# Patient Record
Sex: FEMALE | Race: OTHER | HISPANIC OR LATINO | Employment: OTHER | URBAN - METROPOLITAN AREA
[De-identification: names, ages, dates, MRNs, and addresses within clinical notes are randomized per-mention and may not be internally consistent; named-entity substitution may affect disease eponyms.]

---

## 2021-01-15 DIAGNOSIS — Z23 NEED FOR VACCINATION: ICD-10-CM

## 2022-06-21 ENCOUNTER — HOSPITAL ENCOUNTER (OUTPATIENT)
Dept: RADIOLOGY | Facility: MEDICAL CENTER | Age: 87
End: 2022-06-21
Attending: FAMILY MEDICINE

## 2022-06-21 ENCOUNTER — OFFICE VISIT (OUTPATIENT)
Dept: URGENT CARE | Facility: PHYSICIAN GROUP | Age: 87
End: 2022-06-21
Payer: MEDICARE

## 2022-06-21 ENCOUNTER — APPOINTMENT (OUTPATIENT)
Dept: RADIOLOGY | Facility: MEDICAL CENTER | Age: 87
DRG: 481 | End: 2022-06-21
Attending: EMERGENCY MEDICINE
Payer: MEDICARE

## 2022-06-21 ENCOUNTER — HOSPITAL ENCOUNTER (INPATIENT)
Facility: MEDICAL CENTER | Age: 87
LOS: 4 days | DRG: 481 | End: 2022-06-25
Attending: EMERGENCY MEDICINE | Admitting: HOSPITALIST
Payer: MEDICARE

## 2022-06-21 VITALS
SYSTOLIC BLOOD PRESSURE: 122 MMHG | BODY MASS INDEX: 18.88 KG/M2 | OXYGEN SATURATION: 95 % | RESPIRATION RATE: 20 BRPM | WEIGHT: 100 LBS | DIASTOLIC BLOOD PRESSURE: 70 MMHG | HEIGHT: 61 IN | HEART RATE: 88 BPM | TEMPERATURE: 98.6 F

## 2022-06-21 DIAGNOSIS — S72.142A CLOSED DISPLACED INTERTROCHANTERIC FRACTURE OF LEFT FEMUR, INITIAL ENCOUNTER (HCC): ICD-10-CM

## 2022-06-21 DIAGNOSIS — M25.552 LEFT HIP PAIN: ICD-10-CM
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
## 2022-06-21 DIAGNOSIS — S72.142A CLOSED COMMINUTED INTERTROCHANTERIC FRACTURE OF LEFT FEMUR, INITIAL ENCOUNTER (HCC): ICD-10-CM

## 2022-06-21 DIAGNOSIS — I35.0 SEVERE AORTIC STENOSIS: ICD-10-CM

## 2022-06-21 PROBLEM — S72.143A INTERTROCHANTERIC FRACTURE OF FEMUR (HCC): Status: ACTIVE | Noted: 2022-06-21

## 2022-06-21 PROBLEM — M84.750A: Status: ACTIVE | Noted: 2022-06-21

## 2022-06-21 PROBLEM — I10 HYPERTENSION: Status: ACTIVE | Noted: 2022-06-21

## 2022-06-21 PROBLEM — M81.0 OSTEOPOROSIS: Status: ACTIVE | Noted: 2022-06-21

## 2022-06-21 PROBLEM — R42 LIGHTHEADEDNESS: Status: ACTIVE | Noted: 2022-06-21

## 2022-06-21 PROBLEM — D69.6 THROMBOCYTOPENIA (HCC): Status: ACTIVE | Noted: 2022-06-21

## 2022-06-21 PROBLEM — D72.829 LEUKOCYTOSIS: Status: ACTIVE | Noted: 2022-06-21

## 2022-06-21 PROBLEM — E46 MALNUTRITION (HCC): Status: ACTIVE | Noted: 2022-06-21

## 2022-06-21 LAB
25(OH)D3 SERPL-MCNC: 23 NG/ML (ref 30–100)
ALBUMIN SERPL BCP-MCNC: 4.2 G/DL (ref 3.2–4.9)
ALBUMIN/GLOB SERPL: 1.2 G/DL
ALP SERPL-CCNC: 84 U/L (ref 30–99)
ALT SERPL-CCNC: 16 U/L (ref 2–50)
ANION GAP SERPL CALC-SCNC: 11 MMOL/L (ref 7–16)
APTT PPP: 29.8 SEC (ref 24.7–36)
AST SERPL-CCNC: 19 U/L (ref 12–45)
BASOPHILS # BLD AUTO: 0.4 % (ref 0–1.8)
BASOPHILS # BLD: 0.04 K/UL (ref 0–0.12)
BILIRUB SERPL-MCNC: 0.7 MG/DL (ref 0.1–1.5)
BUN SERPL-MCNC: 28 MG/DL (ref 8–22)
CALCIUM SERPL-MCNC: 9.5 MG/DL (ref 8.5–10.5)
CHLORIDE SERPL-SCNC: 108 MMOL/L (ref 96–112)
CO2 SERPL-SCNC: 23 MMOL/L (ref 20–33)
CREAT SERPL-MCNC: 0.6 MG/DL (ref 0.5–1.4)
EKG IMPRESSION: NORMAL
EOSINOPHIL # BLD AUTO: 0.05 K/UL (ref 0–0.51)
EOSINOPHIL NFR BLD: 0.5 % (ref 0–6.9)
ERYTHROCYTE [DISTWIDTH] IN BLOOD BY AUTOMATED COUNT: 53.3 FL (ref 35.9–50)
GFR SERPLBLD CREATININE-BSD FMLA CKD-EPI: 87 ML/MIN/1.73 M 2
GLOBULIN SER CALC-MCNC: 3.5 G/DL (ref 1.9–3.5)
GLUCOSE SERPL-MCNC: 121 MG/DL (ref 65–99)
HCT VFR BLD AUTO: 35.9 % (ref 37–47)
HGB BLD-MCNC: 12 G/DL (ref 12–16)
IMM GRANULOCYTES # BLD AUTO: 0.04 K/UL (ref 0–0.11)
IMM GRANULOCYTES NFR BLD AUTO: 0.4 % (ref 0–0.9)
INR PPP: 1.17 (ref 0.87–1.13)
LYMPHOCYTES # BLD AUTO: 0.82 K/UL (ref 1–4.8)
LYMPHOCYTES NFR BLD: 7.4 % (ref 22–41)
MAGNESIUM SERPL-MCNC: 2.1 MG/DL (ref 1.5–2.5)
MCH RBC QN AUTO: 31.4 PG (ref 27–33)
MCHC RBC AUTO-ENTMCNC: 33.4 G/DL (ref 33.6–35)
MCV RBC AUTO: 94 FL (ref 81.4–97.8)
MONOCYTES # BLD AUTO: 0.94 K/UL (ref 0–0.85)
MONOCYTES NFR BLD AUTO: 8.5 % (ref 0–13.4)
NEUTROPHILS # BLD AUTO: 9.22 K/UL (ref 2–7.15)
NEUTROPHILS NFR BLD: 82.8 % (ref 44–72)
NRBC # BLD AUTO: 0 K/UL
NRBC BLD-RTO: 0 /100 WBC
PLATELET # BLD AUTO: 147 K/UL (ref 164–446)
PMV BLD AUTO: 11.5 FL (ref 9–12.9)
POTASSIUM SERPL-SCNC: 3.4 MMOL/L (ref 3.6–5.5)
PROT SERPL-MCNC: 7.7 G/DL (ref 6–8.2)
PROTHROMBIN TIME: 14.5 SEC (ref 12–14.6)
RBC # BLD AUTO: 3.82 M/UL (ref 4.2–5.4)
SODIUM SERPL-SCNC: 142 MMOL/L (ref 135–145)
VIT B12 SERPL-MCNC: 575 PG/ML (ref 211–911)
WBC # BLD AUTO: 11.1 K/UL (ref 4.8–10.8)

## 2022-06-21 PROCEDURE — 700111 HCHG RX REV CODE 636 W/ 250 OVERRIDE (IP): Performed by: STUDENT IN AN ORGANIZED HEALTH CARE EDUCATION/TRAINING PROGRAM

## 2022-06-21 PROCEDURE — 73502 X-RAY EXAM HIP UNI 2-3 VIEWS: CPT | Mod: LT

## 2022-06-21 PROCEDURE — 80053 COMPREHEN METABOLIC PANEL: CPT

## 2022-06-21 PROCEDURE — 770001 HCHG ROOM/CARE - MED/SURG/GYN PRIV*

## 2022-06-21 PROCEDURE — 700102 HCHG RX REV CODE 250 W/ 637 OVERRIDE(OP): Performed by: HOSPITALIST

## 2022-06-21 PROCEDURE — 99223 1ST HOSP IP/OBS HIGH 75: CPT | Mod: AI,GC | Performed by: HOSPITALIST

## 2022-06-21 PROCEDURE — 96375 TX/PRO/DX INJ NEW DRUG ADDON: CPT

## 2022-06-21 PROCEDURE — 700111 HCHG RX REV CODE 636 W/ 250 OVERRIDE (IP): Performed by: EMERGENCY MEDICINE

## 2022-06-21 PROCEDURE — 85730 THROMBOPLASTIN TIME PARTIAL: CPT

## 2022-06-21 PROCEDURE — 700111 HCHG RX REV CODE 636 W/ 250 OVERRIDE (IP): Performed by: HOSPITALIST

## 2022-06-21 PROCEDURE — 71045 X-RAY EXAM CHEST 1 VIEW: CPT

## 2022-06-21 PROCEDURE — 99205 OFFICE O/P NEW HI 60 MIN: CPT | Performed by: FAMILY MEDICINE

## 2022-06-21 PROCEDURE — 82306 VITAMIN D 25 HYDROXY: CPT

## 2022-06-21 PROCEDURE — 96372 THER/PROPH/DIAG INJ SC/IM: CPT

## 2022-06-21 PROCEDURE — 36415 COLL VENOUS BLD VENIPUNCTURE: CPT

## 2022-06-21 PROCEDURE — 93005 ELECTROCARDIOGRAM TRACING: CPT | Performed by: STUDENT IN AN ORGANIZED HEALTH CARE EDUCATION/TRAINING PROGRAM

## 2022-06-21 PROCEDURE — 73552 X-RAY EXAM OF FEMUR 2/>: CPT | Mod: LT

## 2022-06-21 PROCEDURE — 99291 CRITICAL CARE FIRST HOUR: CPT

## 2022-06-21 PROCEDURE — 96374 THER/PROPH/DIAG INJ IV PUSH: CPT

## 2022-06-21 PROCEDURE — 99221 1ST HOSP IP/OBS SF/LOW 40: CPT | Mod: 57 | Performed by: STUDENT IN AN ORGANIZED HEALTH CARE EDUCATION/TRAINING PROGRAM

## 2022-06-21 PROCEDURE — 700105 HCHG RX REV CODE 258: Performed by: STUDENT IN AN ORGANIZED HEALTH CARE EDUCATION/TRAINING PROGRAM

## 2022-06-21 PROCEDURE — A9270 NON-COVERED ITEM OR SERVICE: HCPCS | Performed by: HOSPITALIST

## 2022-06-21 PROCEDURE — 85025 COMPLETE CBC W/AUTO DIFF WBC: CPT

## 2022-06-21 PROCEDURE — A9270 NON-COVERED ITEM OR SERVICE: HCPCS | Performed by: STUDENT IN AN ORGANIZED HEALTH CARE EDUCATION/TRAINING PROGRAM

## 2022-06-21 PROCEDURE — 83735 ASSAY OF MAGNESIUM: CPT

## 2022-06-21 PROCEDURE — 82607 VITAMIN B-12: CPT

## 2022-06-21 PROCEDURE — 85610 PROTHROMBIN TIME: CPT

## 2022-06-21 PROCEDURE — 700102 HCHG RX REV CODE 250 W/ 637 OVERRIDE(OP): Performed by: STUDENT IN AN ORGANIZED HEALTH CARE EDUCATION/TRAINING PROGRAM

## 2022-06-21 RX ORDER — HYDRALAZINE HYDROCHLORIDE 20 MG/ML
10 INJECTION INTRAMUSCULAR; INTRAVENOUS EVERY 4 HOURS PRN
Status: DISCONTINUED | OUTPATIENT
Start: 2022-06-21 | End: 2022-06-21

## 2022-06-21 RX ORDER — POTASSIUM CHLORIDE 20 MEQ/1
40 TABLET, EXTENDED RELEASE ORAL ONCE
Status: COMPLETED | OUTPATIENT
Start: 2022-06-21 | End: 2022-06-21

## 2022-06-21 RX ORDER — ACETAMINOPHEN 500 MG
1000 TABLET ORAL EVERY 8 HOURS
Status: DISCONTINUED | OUTPATIENT
Start: 2022-06-21 | End: 2022-06-22

## 2022-06-21 RX ORDER — BISACODYL 10 MG
10 SUPPOSITORY, RECTAL RECTAL
Status: DISCONTINUED | OUTPATIENT
Start: 2022-06-21 | End: 2022-06-25 | Stop reason: HOSPADM

## 2022-06-21 RX ORDER — ONDANSETRON 4 MG/1
4 TABLET, ORALLY DISINTEGRATING ORAL EVERY 4 HOURS PRN
Status: DISCONTINUED | OUTPATIENT
Start: 2022-06-21 | End: 2022-06-25 | Stop reason: HOSPADM

## 2022-06-21 RX ORDER — ACETAMINOPHEN 325 MG/1
650 TABLET ORAL EVERY 6 HOURS PRN
Status: DISCONTINUED | OUTPATIENT
Start: 2022-06-21 | End: 2022-06-21

## 2022-06-21 RX ORDER — METOPROLOL TARTRATE 100 MG/1
100 TABLET ORAL
Status: ON HOLD | COMMUNITY
End: 2022-06-25

## 2022-06-21 RX ORDER — SODIUM CHLORIDE, SODIUM LACTATE, POTASSIUM CHLORIDE, CALCIUM CHLORIDE 600; 310; 30; 20 MG/100ML; MG/100ML; MG/100ML; MG/100ML
INJECTION, SOLUTION INTRAVENOUS CONTINUOUS
Status: ACTIVE | OUTPATIENT
Start: 2022-06-21 | End: 2022-06-22

## 2022-06-21 RX ORDER — HYDROMORPHONE HYDROCHLORIDE 1 MG/ML
0.5 INJECTION, SOLUTION INTRAMUSCULAR; INTRAVENOUS; SUBCUTANEOUS
Status: DISCONTINUED | OUTPATIENT
Start: 2022-06-21 | End: 2022-06-25 | Stop reason: HOSPADM

## 2022-06-21 RX ORDER — ENOXAPARIN SODIUM 100 MG/ML
40 INJECTION SUBCUTANEOUS DAILY
Status: COMPLETED | OUTPATIENT
Start: 2022-06-21 | End: 2022-06-21

## 2022-06-21 RX ORDER — AMLODIPINE BESYLATE 5 MG/1
2.5 TABLET ORAL
Status: DISCONTINUED | OUTPATIENT
Start: 2022-06-21 | End: 2022-06-21

## 2022-06-21 RX ORDER — AMLODIPINE BESYLATE 5 MG/1
5 TABLET ORAL
Status: DISCONTINUED | OUTPATIENT
Start: 2022-06-21 | End: 2022-06-21

## 2022-06-21 RX ORDER — ENALAPRILAT 1.25 MG/ML
1.25 INJECTION INTRAVENOUS EVERY 6 HOURS PRN
Status: DISCONTINUED | OUTPATIENT
Start: 2022-06-21 | End: 2022-06-25 | Stop reason: HOSPADM

## 2022-06-21 RX ORDER — AMOXICILLIN 250 MG
2 CAPSULE ORAL 2 TIMES DAILY
Status: DISCONTINUED | OUTPATIENT
Start: 2022-06-21 | End: 2022-06-25 | Stop reason: HOSPADM

## 2022-06-21 RX ORDER — SODIUM CHLORIDE 9 MG/ML
500 INJECTION, SOLUTION INTRAVENOUS ONCE
Status: COMPLETED | OUTPATIENT
Start: 2022-06-22 | End: 2022-06-22

## 2022-06-21 RX ORDER — HYDRALAZINE HYDROCHLORIDE 20 MG/ML
20 INJECTION INTRAMUSCULAR; INTRAVENOUS EVERY 4 HOURS PRN
Status: DISCONTINUED | OUTPATIENT
Start: 2022-06-21 | End: 2022-06-25 | Stop reason: HOSPADM

## 2022-06-21 RX ORDER — POLYETHYLENE GLYCOL 3350 17 G/17G
1 POWDER, FOR SOLUTION ORAL
Status: DISCONTINUED | OUTPATIENT
Start: 2022-06-21 | End: 2022-06-25 | Stop reason: HOSPADM

## 2022-06-21 RX ORDER — HEPARIN SODIUM 5000 [USP'U]/ML
5000 INJECTION, SOLUTION INTRAVENOUS; SUBCUTANEOUS EVERY 8 HOURS
Status: DISCONTINUED | OUTPATIENT
Start: 2022-06-21 | End: 2022-06-21

## 2022-06-21 RX ORDER — AMLODIPINE BESYLATE 5 MG/1
5 TABLET ORAL
Status: DISCONTINUED | OUTPATIENT
Start: 2022-06-21 | End: 2022-06-25 | Stop reason: HOSPADM

## 2022-06-21 RX ORDER — OXYCODONE HYDROCHLORIDE 5 MG/1
5 TABLET ORAL EVERY 4 HOURS PRN
Status: DISCONTINUED | OUTPATIENT
Start: 2022-06-21 | End: 2022-06-25 | Stop reason: HOSPADM

## 2022-06-21 RX ORDER — ONDANSETRON 2 MG/ML
4 INJECTION INTRAMUSCULAR; INTRAVENOUS EVERY 4 HOURS PRN
Status: DISCONTINUED | OUTPATIENT
Start: 2022-06-21 | End: 2022-06-25 | Stop reason: HOSPADM

## 2022-06-21 RX ADMIN — POTASSIUM CHLORIDE 40 MEQ: 1500 TABLET, EXTENDED RELEASE ORAL at 15:55

## 2022-06-21 RX ADMIN — OXYCODONE 5 MG: 5 TABLET ORAL at 15:55

## 2022-06-21 RX ADMIN — ACETAMINOPHEN 1000 MG: 500 TABLET, FILM COATED ORAL at 15:55

## 2022-06-21 RX ADMIN — FENTANYL CITRATE 50 MCG: 0.05 INJECTION, SOLUTION INTRAMUSCULAR; INTRAVENOUS at 12:54

## 2022-06-21 RX ADMIN — HYDRALAZINE HYDROCHLORIDE 20 MG: 20 INJECTION INTRAMUSCULAR; INTRAVENOUS at 22:18

## 2022-06-21 RX ADMIN — ENOXAPARIN SODIUM 40 MG: 40 INJECTION SUBCUTANEOUS at 15:56

## 2022-06-21 RX ADMIN — SODIUM CHLORIDE, POTASSIUM CHLORIDE, SODIUM LACTATE AND CALCIUM CHLORIDE: 600; 310; 30; 20 INJECTION, SOLUTION INTRAVENOUS at 16:17

## 2022-06-21 RX ADMIN — AMLODIPINE BESYLATE 2.5 MG: 5 TABLET ORAL at 15:55

## 2022-06-21 RX ADMIN — ACETAMINOPHEN 1000 MG: 500 TABLET, FILM COATED ORAL at 22:18

## 2022-06-21 RX ADMIN — AMLODIPINE BESYLATE 2.5 MG: 5 TABLET ORAL at 13:29

## 2022-06-21 RX ADMIN — SODIUM CHLORIDE 500 ML: 9 INJECTION, SOLUTION INTRAVENOUS at 23:30

## 2022-06-21 ASSESSMENT — ENCOUNTER SYMPTOMS
COUGH: 0
WEAKNESS: 0
BLOOD IN STOOL: 0
LOSS OF CONSCIOUSNESS: 0
MYALGIAS: 0
CHILLS: 0
VOMITING: 0
DIARRHEA: 0
DOUBLE VISION: 0
SENSORY CHANGE: 0
SPEECH CHANGE: 0
HEARTBURN: 0
ABDOMINAL PAIN: 0
PND: 0
TINGLING: 0
SEIZURES: 0
FOCAL WEAKNESS: 0
DIZZINESS: 1
PALPITATIONS: 0
FEVER: 0
ORTHOPNEA: 0
BLURRED VISION: 0
SHORTNESS OF BREATH: 0
HEADACHES: 0
NAUSEA: 0

## 2022-06-21 NOTE — PROGRESS NOTES
"  Subjective:      87 y.o. female presents to urgent care for evaluation post fall. Sunday she was in bed and tried to get up quickly as she was excited to see family and had a fall. The fall was unwitnessed. She denies hitting her head or loss of consciousness. She now has pain to her left hip and thigh, it is constant, it's described as stabbing, currently rated 10/10. She has been using Tylenol and Flanax with only minimal relief in symptoms.  She has been unable to weight-bear since the fall, family has been lifting her to take her to the bathroom, otherwise she has just been laying in bed.    She denies any other questions or concerns at this time.    Current problem list, medication, and past medical/surgical history were reviewed in Epic.    ROS  See HPI     Objective:      /70   Pulse 88   Temp 37 °C (98.6 °F) (Temporal)   Resp 20   Ht 1.549 m (5' 1\")   Wt 45.4 kg (100 lb)   SpO2 95%   BMI 18.89 kg/m²     Physical Exam  Constitutional:       General: She is not in acute distress.     Appearance: She is not diaphoretic.   Cardiovascular:      Rate and Rhythm: Normal rate and regular rhythm.      Heart sounds: Normal heart sounds.   Pulmonary:      Effort: Pulmonary effort is normal. No respiratory distress.      Breath sounds: Normal breath sounds.   Musculoskeletal:      Comments: Patient is unable to weight-bear.  She is tender to palpation of left hip.  Deformity noted to left hip.   Neurological:      Mental Status: She is alert.   Psychiatric:         Mood and Affect: Affect normal.         Judgment: Judgment normal.       Assessment/Plan:     1. Fall, initial encounter  2. Closed displaced intertrochanteric fracture of left femur, initial encounter ([facility])  3. Left hip pain  XRAY hip: Left intertrochanteric fracture with displacement  Patient has been nonweightbearing for the last couple of days, is normally totally independent with her activities of daily living.  Discussed that " intertrochanteric fractures are typically repaired with fracture, especially given her independent status. At this time, I feel the patient requires a higher level of care in the ED for closer monitoring, potential surgery, pain control, stat lab work and/or imaging for further evaluation. This has been discussed with the patient and she states agreement and understanding.  She will go via private vehicle to [facility] without delay.    - DX-HIP-COMPLETE - UNILATERAL 2+ LEFT; Future      Instructed to return to Urgent Care or nearest Emergency Department if symptoms fail to improve, for any change in condition, further concerns, or new concerning symptoms. Patient states understanding of the plan of care and discharge instructions.    Yusra Trammell M.D.

## 2022-06-21 NOTE — H&P
History & Physical Note    Date of Admission: 6/21/2022  Admission Status: Inpatient  [facility] Team: [facility]  Attending: Kristina Herring M.D.   Senior Resident: Dr. Matute  Contact Number: [phone]    Chief Complaint: Ground level fall     History of Present Illness (HPI):   Leslie is a 87 y.o. female medical history notable for hypertension, has been on metoprolol 100 mg daily tartarate from [place], no change of medications, [place], came from [place] 3 weeks back, presenting to the emergency department after having fall. She went to [facility] family clinic after fall on Sunday. Xray showed left femoral fracture therefore she was sent to emergency department. She states she has been having lightheadedness often over the last year, however did not fall or break anything before. This time she was excited to see family members when she was lying, quickly got up, felt lightheaded and collapsed to ground on her left side, did not hit her head or did not lose consciousness. No chest pain or palpitations. She has normal feelings on extremities, no pain out of proportion. Denies any melena, hematochezia, diarrhea, vomiting, arrhythmia history, MI or stroke in past. No abdominal pain. No other complaints. She was hypertensive in emergency department with systolic 150s, some tachycardia 100s, on room air saturating normal. Labs showing normal Hb, mild thrombocytopenia, normal kidney functions and electrolytes, normal liver panel. Mildly elevated WBC with ANC. No signs of infection. Repeat xray   Acute left intratrochanteric fracture, with slight varus angulation    Review of Systems:   Review of Systems   Constitutional: Negative for chills and fever.   HENT: Negative for hearing loss and tinnitus.    Eyes: Negative for blurred vision and double vision.   Respiratory: Negative for cough and shortness of breath.    Cardiovascular: Negative for chest pain, palpitations, orthopnea and PND.   Gastrointestinal: Negative for  abdominal pain, blood in stool, diarrhea, heartburn, melena, nausea and vomiting.   Genitourinary: Negative for dysuria and hematuria.   Musculoskeletal: Negative for myalgias.   Neurological: Positive for dizziness. Negative for tingling, sensory change, speech change, focal weakness, seizures, loss of consciousness, weakness and headaches.       Past Medical History:   Past Medical History was reviewed with patient.      Past Surgical History: Past Surgical History was reviewed with patient.  C section    Medications: Medications have been reviewed with patient.  Prior to Admission Medications   Prescriptions Last Dose Informant Patient Reported? Taking?   Naproxen Sodium (FLANAX PAIN RELIEF PO) 6/20/2022 at hs  Yes Yes   Sig: Take 550 mg by mouth at bedtime.   metoprolol tartrate (LOPRESSOR) 100 MG Tab 6/20/2022 at 1200  Yes Yes   Sig: Take 100 mg by mouth every day.      Facility-Administered Medications: None        Allergies: Allergies have been reviewed with patient.  No Known Allergies    Family History: Denies any osteoporosis, heart attack or strokes, fractures or cancers.    Social History:   Tobacco: never smoker   Alcohol: no alcohol   Recreational drugs (illegal and prescription):  none   Employment: not employed  Activity Level: normoactive without canes or walker   Living situation:  Visits [facility], came from [place]  Recent travel:  [place] 3 weeks back  Primary Care Provider: reviewed Pcp Pt States None  Other (stressors, spirituality, exposures):  none    Physical Exam:   Vitals:  Temp:  [37 °C (98.6 °F)-37.7 °C (99.9 °F)] 37.7 °C (99.9 °F)  Pulse:  [] 106  Resp:  [18-20] 18  BP: (122-159)/(70-81) 159/81  SpO2:  [95 %] 95 %    Physical Exam  Constitutional:       General: She is not in acute distress.     Appearance: She is not ill-appearing.      Comments: cachexic   HENT:      Head: Atraumatic.      Nose: No rhinorrhea.      Mouth/Throat:      Mouth: Mucous membranes are dry.   Eyes:       General: No scleral icterus.     Extraocular Movements: Extraocular movements intact.   Cardiovascular:      Rate and Rhythm: Normal rate and regular rhythm.      Pulses: Normal pulses.      Heart sounds: No murmur heard.    No friction rub. No gallop.      Comments: Peripheral pulses are all intact without vascular involvement  Pulmonary:      Effort: No respiratory distress.      Breath sounds: No wheezing, rhonchi or rales.   Abdominal:      General: Abdomen is flat. There is no distension.      Palpations: Abdomen is soft.      Tenderness: There is no abdominal tenderness. There is no guarding or rebound.   Musculoskeletal:      Right lower leg: No edema.      Left lower leg: No edema.      Comments: Left hip deformity present, pain with palpation laterally.   Skin:     Capillary Refill: Capillary refill takes less than 2 seconds.      Coloration: Skin is pale. Skin is not jaundiced.      Findings: No rash.   Neurological:      General: No focal deficit present.      Mental Status: She is oriented to person, place, and time.      Sensory: No sensory deficit.         Labs:   Recent Labs     06/21/22  1245   RBC 3.82*   HEMOGLOBIN 12.0   HEMATOCRIT 35.9*   PLATELETCT 147*     No results for input(s): SODIUM, POTASSIUM, CHLORIDE, CO2, BUN, CREATININE, MAGNESIUM, PHOSPHORUS, CALCIUM in the last 72 hours.  No results for input(s): ALTSGPT, ASTSGOT, ALKPHOSPHAT, TBILIRUBIN, DBILIRUBIN, GAMMAGT, AMYLASE, LIPASE, ALB, PREALBUMIN, GLUCOSE in the last 72 hours.              Recent Labs     06/21/22  1245   WBC 11.1*   NEUTSPOLYS 82.80*   LYMPHOCYTES 7.40*   MONOCYTES 8.50   EOSINOPHILS 0.50   BASOPHILS 0.40             EKG: Per my read, not obtained, ordered one     Imaging:   DX-CHEST-PORTABLE (1 VIEW)   Final Result      No evidence of acute cardiopulmonary process.      DX-FEMUR-2+ LEFT   Final Result      Acute left intratrochanteric fracture, with slight varus angulation.      EC-ECHOCARDIOGRAM COMPLETE W/O CONT     (Results Pending)         Previous Data Review: reviewed      * Intertrochanteric fracture of femur (HCC)- (present on admission)  Assessment & Plan  ground level fall due to lightheadedness  Femoral intertrochanteric fracture in [facility] medicine urgent care clinic  Ortho was consulted, surgery planned tomorrow  INR/PT is pending  Neurovascular structures are intact.  Admit to ortho floor.  Pain control including opioids.  Monitor for compartment syndrome, no signs at this point.  Patient will need bisphosphonate treatment in outpatient basis.  Denies any melena or hematochezia  NPO at midnight.    Lightheadedness- (present on admission)  Assessment & Plan  Reason for fall  Obtain EKG, no signs of arrhythmia at this time, will see 12 lead. No chest pain or palpitations.  Obtain echocardiogram, no significant heart murmur  No focal deficits, unlikely a stroke, denies any prior mi or stroke.  Could be medication induced, however 100 metoprolol not changed for 4 years, patient states she has been lightheaded most times, this was prescribed due to hypertension, therefore I discontinued this medication and added very low dose amlodipine instead. Continue to monitor  Unable to do orthostats due to fracture. Give gentle fluids.  BP high, no need to check cortisol at this point.    Leukocytosis- (present on admission)  Assessment & Plan  No fever, no signs of infection  Likely stress response given ANC being high.    Hypertension- (present on admission)  Assessment & Plan  Hold metoprolol tartarate.  Initiate amlodipine 2.5 mg, increase if suboptimal response.    Malnutrition (HCC)- (present on admission)  Assessment & Plan  Nutrition consult  Could be underlying malignancy, her appetite is well patient states.  No signs of malignancy at this point.    Osteoporosis- (present on admission)  Assessment & Plan  Patient needs osteoporosis treatment in outpatient basis, never been evaluated  I discussed with family to see primary  care physician.    Thrombocytopenia (HCC)- (present on admission)  Assessment & Plan  No alcohol intake  No pancytopenia.  Likely malnutrition related. Could be b12 deficiency, check level.  No signs of liver cirrhosis or splenomegaly  No signs of HIT/TTP/HUS or other MAHA (pending bilirubin level)  Discontinue naproxen    DVT ppx: Lovenox now, hold for tomorrow.  Code status: Discussed extensively, DNR intubation ok  Diet: Regular now, NPO at midnight.

## 2022-06-21 NOTE — ASSESSMENT & PLAN NOTE
ground level fall due to lightheadedness  Femoral intertrochanteric fracture in family medicine urgent care clinic  Ortho was consulted, surgery planned tomorrow  INR/PT is pending  Neurovascular structures are intact.  Admit to ortho floor.  Pain control including opioids.  Monitor for compartment syndrome, no signs at this point.  Patient will need bisphosphonate treatment in outpatient basis.  Denies any melena or hematochezia  NPO at midnight.

## 2022-06-21 NOTE — ED NOTES
Pt return from imaging. Ortho MD and admitting MD at bedside. Second RN called to bedside for PIV.

## 2022-06-21 NOTE — ASSESSMENT & PLAN NOTE
No alcohol intake  No pancytopenia.  Likely malnutrition related. Could be b12 deficiency, check level.  No signs of liver cirrhosis or splenomegaly  No signs of HIT/TTP/HUS or other MAHA (pending bilirubin level)  Discontinue naproxen

## 2022-06-21 NOTE — ED TRIAGE NOTES
Leslie Edwards  87 y.o. female  Chief Complaint   Patient presents with   • Sent from Urgent Care     Pt had a GLF on 6/19. Pt was unable to walk afterwards, pt with extreme pain to the left leg. - loc, pt takes daily aspirin, - head strike. Today pt presents to urgent care and told pt's daughter she has a broken hip and needed to come to ER for evaluation       Vitals:    06/21/22 1045   BP: (!) 159/81   Pulse: (!) 106   Resp: 18   Temp: 37.7 °C (99.9 °F)   SpO2: 95%       Patient educated on triage process and encouraged to alert staff of any changes in condition.    Pt presents to ER via wheeLchair, pt non weight bearing on the left leg. Pt is able to flex and extend foot on left side, pulses intact. Extremity is warm to the touch.

## 2022-06-21 NOTE — ED PROVIDER NOTES
ED Provider Note    CHIEF COMPLAINT  Chief Complaint   Patient presents with   • Sent from Urgent Care     Pt had a GLF on 6/19. Pt was unable to walk afterwards, pt with extreme pain to the left leg. - loc, pt takes daily aspirin, - head strike. Today pt presents to urgent care and told pt's daughter she has a broken hip and needed to come to ER for evaluation       HPI  Leslie Friend is a 87 y.o. female who presents with left hip pain.  She fell on Father's Day this Sunday after she simply lost her balance.  She did not have a syncopal event.  Did not strike her head.  No head, neck, back pain.  No chest pain or trouble breathing.  She has isolated left hip pain.  Denies prior orthopedic injuries in the past.  She only takes metoprolol for medications.  No acute changes in her medications.  Family tried to manage her pain symptoms at home though the patient was unable to bear weight and went to urgent care today and was found to have a an intertrochanteric fracture.  She was brought to the emergency department for further care.  Unfortunate she did eat just prior to arrival at around 11 AM.    REVIEW OF SYSTEMS  See HPI for further details. All other systems are negative.     PAST MEDICAL HISTORY       SOCIAL HISTORY  Social History     Tobacco Use   • Smoking status: Never Smoker   • Smokeless tobacco: Never Used   Vaping Use   • Vaping Use: Never used   Substance and Sexual Activity   • Alcohol use: No   • Drug use: No   • Sexual activity: Never       SURGICAL HISTORY  patient denies any surgical history    CURRENT MEDICATIONS  Home Medications     Reviewed by Wesley Tran (Pharmacy Tech) on 06/21/22 at 1221  Med List Status: Complete   Medication Last Dose Status   metoprolol tartrate (LOPRESSOR) 100 MG Tab 6/20/2022 Active   Naproxen Sodium (FLANAX PAIN RELIEF PO) 6/20/2022 Active                ALLERGIES  No Known Allergies    PHYSICAL EXAM  VITAL SIGNS: BP (!) 159/81   Pulse (!) 106    Temp 37.7 °C (99.9 °F) (Temporal)   Resp 18   Ht 1.524 m (5')   SpO2 95%   BMI 19.53 kg/m²   Pulse ox interpretation: I interpret this pulse ox as normal.  Constitutional: Alert in no apparent distress.  HENT: No signs of trauma, Bilateral external ears normal, Nose normal.   Eyes: Pupils are equal and reactive, Conjunctiva normal, Non-icteric.   Neck: Normal range of motion, No tenderness, Supple, No stridor.   Cardiovascular: Regular rate and rhythm.   Thorax & Lungs: Normal breath sounds, No respiratory distress.   Abdomen: Bowel sounds normal, Soft, No tenderness, No masses.  Skin: Warm, Dry, No erythema, No rash.   Back: No bony tenderness  Extremities: Intact distal pulses, No edema, left hip tenderness, No cyanosis  Musculoskeletal: Limited range of motion to the left hip secondary to pain, no major deformities noted.   Neurologic: Alert, No focal deficits noted.         DIAGNOSTIC STUDIES / PROCEDURES    EKG - Physician interpretation  Results for orders placed or performed during the hospital encounter of 22   EKG   Result Value Ref Range    Report       [facility] Emergency Dept.    Test Date:  2022  Pt Name:    CAMPBELL QUINTANILLA             Department: ER  MRN:        [id number]                      Room:        05  Gender:     Female                       Technician: [id number]  :        [id number]                   Requested By:JEFFREY LEONADR  Order #:    [id number]                    Reading MD: JORGE PIZARRO MD    Measurements  Intervals                                Axis  Rate:       104                          P:          68  NY:         185                          QRS:        18  QRSD:       92                           T:          154  QT:         383  QTc:        505    Interpretive Statements  Sinus tachycardia  LVH with secondary repolarization abnormality  Prolonged QT interval  No previous ECG available for comparison  Electronically Signed On 2022 13:57:10  PDT by JORGE PIZARRO MD           LABS  Labs Reviewed   CBC WITH DIFFERENTIAL - Abnormal; Notable for the following components:       Result Value    WBC 11.1 (*)     RBC 3.82 (*)     Hematocrit 35.9 (*)     MCHC 33.4 (*)     RDW 53.3 (*)     Platelet Count 147 (*)     Neutrophils-Polys 82.80 (*)     Lymphocytes 7.40 (*)     Neutrophils (Absolute) 9.22 (*)     Lymphs (Absolute) 0.82 (*)     Monos (Absolute) 0.94 (*)     All other components within normal limits    Narrative:     Indicate which anticoagulants the patient is on:->NONE   COMP METABOLIC PANEL - Abnormal; Notable for the following components:    Potassium 3.4 (*)     Glucose 121 (*)     Bun 28 (*)     All other components within normal limits    Narrative:     Indicate which anticoagulants the patient is on:->NONE   PROTHROMBIN TIME - Abnormal; Notable for the following components:    INR 1.17 (*)     All other components within normal limits    Narrative:     Indicate which anticoagulants the patient is on:->NONE   APTT    Narrative:     Indicate which anticoagulants the patient is on:->NONE   ESTIMATED GFR    Narrative:     Indicate which anticoagulants the patient is on:->NONE   MAGNESIUM   VITAMIN B12         RADIOLOGY  DX-CHEST-PORTABLE (1 VIEW)   Final Result      No evidence of acute cardiopulmonary process.      DX-FEMUR-2+ LEFT   Final Result      Acute left intratrochanteric fracture, with slight varus angulation.      EC-ECHOCARDIOGRAM COMPLETE W/O CONT    (Results Pending)         COURSE & MEDICAL DECISION MAKING    Medications   senna-docusate (PERICOLACE or SENOKOT S) 8.6-50 MG per tablet 2 Tablet (has no administration in time range)     And   polyethylene glycol/lytes (MIRALAX) PACKET 1 Packet (has no administration in time range)     And   magnesium hydroxide (MILK OF MAGNESIA) suspension 30 mL (has no administration in time range)     And   bisacodyl (DULCOLAX) suppository 10 mg (has no administration in time range)   lactated ringers  infusion (has no administration in time range)   acetaminophen (Tylenol) tablet 650 mg (has no administration in time range)   hydrALAZINE (APRESOLINE) injection 10 mg (has no administration in time range)   ondansetron (ZOFRAN) syringe/vial injection 4 mg (has no administration in time range)   ondansetron (ZOFRAN ODT) dispertab 4 mg (has no administration in time range)   HYDROmorphone (Dilaudid) injection 0.5 mg (has no administration in time range)   oxyCODONE immediate-release (ROXICODONE) tablet 5 mg (has no administration in time range)   amLODIPine (NORVASC) tablet 2.5 mg (2.5 mg Oral Given 6/21/22 1329)   potassium chloride SA (Kdur) tablet 40 mEq (has no administration in time range)   enoxaparin (Lovenox) inj 40 mg (has no administration in time range)   fentaNYL (SUBLIMAZE) injection 50 mcg (50 mcg Intravenous Given 6/21/22 1254)       Pertinent Labs & Imaging studies reviewed. (See chart for details)  87 y.o. female presenting with left hip pain after she lost her balance and fell at home.  Was diagnosed with a an intertrochanteric fracture and sent here for further evaluation.  She has been unable to bear weight since the injury unsurprisingly.  No other injuries.  No head or neck or back pain.  No chest pain or trouble breathing.  No history of syncope or loss of consciousness.  No headache.  No signs of head trauma.    Patient has isolated left hip fracture.  Neurovascularly intact.  No open wounds.  Spoke with orthopedic surgery and will plan for surgery tomorrow given that the patient is not n.p.o. as she just ate prior to arrival.  Spoke with the admitting hospitalist service and they will see the patient for hospitalization.    The patient was instructed to follow-up with primary care physician for further management.  To return immediately for any worsening symptoms or development of any other concerning signs or symptoms. The patient verbalizes understanding in their own words.    BP (!) 208/93    Pulse (!) 105   Temp 37.7 °C (99.9 °F) (Temporal)   Resp 18   Ht 1.524 m (5')   SpO2 90%   BMI 19.53 kg/m²       FINAL IMPRESSION  1. Closed comminuted intertrochanteric fracture of left femur, initial encounter ([facility])           Electronically signed by: Angel Loyd M.D., 6/21/2022 11:42 AM

## 2022-06-21 NOTE — ASSESSMENT & PLAN NOTE
Reason for fall  Obtain EKG, no signs of arrhythmia at this time, will see 12 lead. No chest pain or palpitations.  Obtain echocardiogram, no significant heart murmur  No focal deficits, unlikely a stroke, denies any prior mi or stroke.  Could be medication induced, however 100 metoprolol not changed for 4 years, patient states she has been lightheaded most times, this was prescribed due to hypertension, therefore I discontinued this medication and added very low dose amlodipine instead. Continue to monitor  Unable to do orthostats due to fracture. Give gentle fluids.  BP high, no need to check cortisol at this point.   pw sob found be hypoxemic. xr concerning for pna, required ppv. pan cx. abx given. hd stable. comfortable on bipap. admitted to medicine. pw sob found be hypoxemic. xr concerning for pna,. meets sepsis criteria however ivf given cautiously 2/2 chf. pt required ppv. pan cx. abx given. hd stable. comfortable on bipap. admitted to medicine.

## 2022-06-21 NOTE — ASSESSMENT & PLAN NOTE
Patient needs osteoporosis treatment in outpatient basis, never been evaluated  I discussed with family to see primary care physician.

## 2022-06-21 NOTE — ASSESSMENT & PLAN NOTE
Nutrition consult  Could be underlying malignancy, her appetite is well patient states.  No signs of malignancy at this point.

## 2022-06-21 NOTE — CONSULTS
6/21/2022    Time Called: 1145  Time Arrived: 1220    The patient was seen at the request of ED    HPI: Leslie Friend is a 87 y.o. female who presents with complaints of pain to left hip.  This started today after GLF.  The pain is 9/10 and is described as sharp.  The pain is made worse by palpation of the area and made better by rest and immobilization.    History reviewed. No pertinent past medical history.    History reviewed. No pertinent surgical history.    Medications  No current facility-administered medications on file prior to encounter.     Current Outpatient Medications on File Prior to Encounter   Medication Sig Dispense Refill   • aspirin (ASA) 81 MG Chew Tab chewable tablet Take 81 mg by mouth every day.     • diphenoxylate-atropine (LOMOTIL) 2.5-0.025 MG Tab Take 1 Tab by mouth 4 times a day as needed for Diarrhea.         Allergies  Patient has no known allergies.    ROS  . All other systems were reviewed and found to be negative    History reviewed. No pertinent family history.    Social History     Socioeconomic History   • Marital status:    Tobacco Use   • Smoking status: Never Smoker   • Smokeless tobacco: Never Used   Vaping Use   • Vaping Use: Never used   Substance and Sexual Activity   • Alcohol use: No   • Drug use: No   • Sexual activity: Never       Physical Exam  Vitals  BP (!) 159/81   Pulse (!) 106   Temp 37.7 °C (99.9 °F) (Temporal)   Resp 18   Ht 1.524 m (5')   SpO2 95%   General: Well Developed, Well Nourished, Age appropriate appearance  HEENT: Normocephalic, atraumatic  Psych: Normal mood and affect  Neck: Supple, nontender, no masses  Lungs: Breathing unlabored, No audible wheezing  Heart: Regular heart rate and rhythm  Abdomen: Soft, NT, ND  Neuro: Sensation grossly intact to BUE and BLE, moving all four extremities  Skin: Intact, no open wounds  Vascular: foot warm and perfused, Capillary refill <2 seconds  MSK: pain with logroll LLE, ankle df/pf  intact      Radiographs:  DX-FEMUR-2+ LEFT    (Results Pending)   DX-CHEST-PORTABLE (1 VIEW)    (Results Pending)       Laboratory Values      No results for input(s): SODIUM, POTASSIUM, CHLORIDE, CO2, GLUCOSE, BUN, CPKTOTAL in the last 72 hours.          Impression:87F GLF left intertrochanteric femur fracture    Plan:We discussed the diagnosis and findings with the patient at length.  We reviewed possible non operative and operative interventions and the risks and benefits of each of these.  she had a chance to ask questions and all of these were answered to her satisfaction. The patient chose to proceed with  surgical intervention. Risks and benefits of surgery were discussed which include but are not limited to bleeding, infection, neurovascular damage, malunion, nonunion, instability, limb length discrepancy, DVT, PE, MI, Stroke and death. They understand these risks and wish to proceed.    Plan for OR today or tomorrow am for fixation left femur fx  Admit to IM service  NWB LLE  Pain control per current regimen    Benito Calix MD  Orthopedic Trauma Surgery

## 2022-06-22 ENCOUNTER — ANESTHESIA (OUTPATIENT)
Dept: SURGERY | Facility: MEDICAL CENTER | Age: 87
DRG: 481 | End: 2022-06-22
Payer: MEDICARE

## 2022-06-22 ENCOUNTER — ANESTHESIA EVENT (OUTPATIENT)
Dept: SURGERY | Facility: MEDICAL CENTER | Age: 87
DRG: 481 | End: 2022-06-22
Payer: MEDICARE

## 2022-06-22 ENCOUNTER — APPOINTMENT (OUTPATIENT)
Dept: RADIOLOGY | Facility: MEDICAL CENTER | Age: 87
DRG: 481 | End: 2022-06-22
Attending: ORTHOPAEDIC SURGERY
Payer: MEDICARE

## 2022-06-22 PROBLEM — D62 ACUTE BLOOD LOSS ANEMIA: Status: ACTIVE | Noted: 2022-06-22

## 2022-06-22 LAB
ABO + RH BLD: NORMAL
ABO GROUP BLD: NORMAL
ALBUMIN SERPL BCP-MCNC: 2.9 G/DL (ref 3.2–4.9)
ALBUMIN/GLOB SERPL: 1.1 G/DL
ALP SERPL-CCNC: 56 U/L (ref 30–99)
ALT SERPL-CCNC: 11 U/L (ref 2–50)
ANION GAP SERPL CALC-SCNC: 9 MMOL/L (ref 7–16)
AST SERPL-CCNC: 20 U/L (ref 12–45)
BARCODED ABORH UBTYP: 5100
BARCODED PRD CODE UBPRD: NORMAL
BARCODED UNIT NUM UBUNT: NORMAL
BASOPHILS # BLD AUTO: 0.3 % (ref 0–1.8)
BASOPHILS # BLD: 0.03 K/UL (ref 0–0.12)
BILIRUB SERPL-MCNC: 0.3 MG/DL (ref 0.1–1.5)
BLD GP AB SCN SERPL QL: NORMAL
BUN SERPL-MCNC: 25 MG/DL (ref 8–22)
CALCIUM SERPL-MCNC: 8 MG/DL (ref 8.5–10.5)
CHLORIDE SERPL-SCNC: 109 MMOL/L (ref 96–112)
CO2 SERPL-SCNC: 21 MMOL/L (ref 20–33)
COMPONENT R 8504R: NORMAL
CREAT SERPL-MCNC: 0.54 MG/DL (ref 0.5–1.4)
EOSINOPHIL # BLD AUTO: 0.03 K/UL (ref 0–0.51)
EOSINOPHIL NFR BLD: 0.3 % (ref 0–6.9)
ERYTHROCYTE [DISTWIDTH] IN BLOOD BY AUTOMATED COUNT: 55 FL (ref 35.9–50)
GFR SERPLBLD CREATININE-BSD FMLA CKD-EPI: 89 ML/MIN/1.73 M 2
GLOBULIN SER CALC-MCNC: 2.7 G/DL (ref 1.9–3.5)
GLUCOSE SERPL-MCNC: 126 MG/DL (ref 65–99)
HCT VFR BLD AUTO: 26.2 % (ref 37–47)
HGB BLD-MCNC: 8.3 G/DL (ref 12–16)
IMM GRANULOCYTES # BLD AUTO: 0.04 K/UL (ref 0–0.11)
IMM GRANULOCYTES NFR BLD AUTO: 0.4 % (ref 0–0.9)
LYMPHOCYTES # BLD AUTO: 0.45 K/UL (ref 1–4.8)
LYMPHOCYTES NFR BLD: 5 % (ref 22–41)
MCH RBC QN AUTO: 30.7 PG (ref 27–33)
MCHC RBC AUTO-ENTMCNC: 31.7 G/DL (ref 33.6–35)
MCV RBC AUTO: 97 FL (ref 81.4–97.8)
MONOCYTES # BLD AUTO: 0.57 K/UL (ref 0–0.85)
MONOCYTES NFR BLD AUTO: 6.4 % (ref 0–13.4)
NEUTROPHILS # BLD AUTO: 7.83 K/UL (ref 2–7.15)
NEUTROPHILS NFR BLD: 87.6 % (ref 44–72)
NRBC # BLD AUTO: 0 K/UL
NRBC BLD-RTO: 0 /100 WBC
PLATELET # BLD AUTO: 118 K/UL (ref 164–446)
PMV BLD AUTO: 11.9 FL (ref 9–12.9)
POTASSIUM SERPL-SCNC: 3.9 MMOL/L (ref 3.6–5.5)
PRODUCT TYPE UPROD: NORMAL
PROT SERPL-MCNC: 5.6 G/DL (ref 6–8.2)
RBC # BLD AUTO: 2.7 M/UL (ref 4.2–5.4)
RH BLD: NORMAL
SODIUM SERPL-SCNC: 139 MMOL/L (ref 135–145)
UNIT STATUS USTAT: NORMAL
WBC # BLD AUTO: 9 K/UL (ref 4.8–10.8)

## 2022-06-22 PROCEDURE — 86901 BLOOD TYPING SEROLOGIC RH(D): CPT

## 2022-06-22 PROCEDURE — 160048 HCHG OR STATISTICAL LEVEL 1-5: Performed by: ORTHOPAEDIC SURGERY

## 2022-06-22 PROCEDURE — 700102 HCHG RX REV CODE 250 W/ 637 OVERRIDE(OP): Performed by: STUDENT IN AN ORGANIZED HEALTH CARE EDUCATION/TRAINING PROGRAM

## 2022-06-22 PROCEDURE — 700102 HCHG RX REV CODE 250 W/ 637 OVERRIDE(OP): Performed by: ANESTHESIOLOGY

## 2022-06-22 PROCEDURE — 27245 TREAT THIGH FRACTURE: CPT | Mod: LT | Performed by: ORTHOPAEDIC SURGERY

## 2022-06-22 PROCEDURE — 01230 ANES OPN UPPER 2/3 FEMUR NOS: CPT | Performed by: ANESTHESIOLOGY

## 2022-06-22 PROCEDURE — 160035 HCHG PACU - 1ST 60 MINS PHASE I: Performed by: ORTHOPAEDIC SURGERY

## 2022-06-22 PROCEDURE — 86900 BLOOD TYPING SEROLOGIC ABO: CPT

## 2022-06-22 PROCEDURE — 700111 HCHG RX REV CODE 636 W/ 250 OVERRIDE (IP): Performed by: STUDENT IN AN ORGANIZED HEALTH CARE EDUCATION/TRAINING PROGRAM

## 2022-06-22 PROCEDURE — 700105 HCHG RX REV CODE 258: Performed by: ANESTHESIOLOGY

## 2022-06-22 PROCEDURE — 160009 HCHG ANES TIME/MIN: Performed by: ORTHOPAEDIC SURGERY

## 2022-06-22 PROCEDURE — 700111 HCHG RX REV CODE 636 W/ 250 OVERRIDE (IP): Performed by: ANESTHESIOLOGY

## 2022-06-22 PROCEDURE — 700102 HCHG RX REV CODE 250 W/ 637 OVERRIDE(OP): Performed by: HOSPITALIST

## 2022-06-22 PROCEDURE — 0QS736Z REPOSITION LEFT UPPER FEMUR WITH INTRAMEDULLARY INTERNAL FIXATION DEVICE, PERCUTANEOUS APPROACH: ICD-10-PCS | Performed by: ORTHOPAEDIC SURGERY

## 2022-06-22 PROCEDURE — 700105 HCHG RX REV CODE 258: Performed by: STUDENT IN AN ORGANIZED HEALTH CARE EDUCATION/TRAINING PROGRAM

## 2022-06-22 PROCEDURE — 160029 HCHG SURGERY MINUTES - 1ST 30 MINS LEVEL 4: Performed by: ORTHOPAEDIC SURGERY

## 2022-06-22 PROCEDURE — 160002 HCHG RECOVERY MINUTES (STAT): Performed by: ORTHOPAEDIC SURGERY

## 2022-06-22 PROCEDURE — 700101 HCHG RX REV CODE 250: Performed by: ANESTHESIOLOGY

## 2022-06-22 PROCEDURE — A9270 NON-COVERED ITEM OR SERVICE: HCPCS | Performed by: ORTHOPAEDIC SURGERY

## 2022-06-22 PROCEDURE — 770001 HCHG ROOM/CARE - MED/SURG/GYN PRIV*

## 2022-06-22 PROCEDURE — 160041 HCHG SURGERY MINUTES - EA ADDL 1 MIN LEVEL 4: Performed by: ORTHOPAEDIC SURGERY

## 2022-06-22 PROCEDURE — 85025 COMPLETE CBC W/AUTO DIFF WBC: CPT

## 2022-06-22 PROCEDURE — A9270 NON-COVERED ITEM OR SERVICE: HCPCS | Performed by: HOSPITALIST

## 2022-06-22 PROCEDURE — 73552 X-RAY EXAM OF FEMUR 2/>: CPT | Mod: LT

## 2022-06-22 PROCEDURE — 700102 HCHG RX REV CODE 250 W/ 637 OVERRIDE(OP): Performed by: ORTHOPAEDIC SURGERY

## 2022-06-22 PROCEDURE — 99100 ANES PT EXTEME AGE<1 YR&>70: CPT | Performed by: ANESTHESIOLOGY

## 2022-06-22 PROCEDURE — A9270 NON-COVERED ITEM OR SERVICE: HCPCS | Performed by: STUDENT IN AN ORGANIZED HEALTH CARE EDUCATION/TRAINING PROGRAM

## 2022-06-22 PROCEDURE — 160036 HCHG PACU - EA ADDL 30 MINS PHASE I: Performed by: ORTHOPAEDIC SURGERY

## 2022-06-22 PROCEDURE — A9270 NON-COVERED ITEM OR SERVICE: HCPCS | Performed by: ANESTHESIOLOGY

## 2022-06-22 PROCEDURE — 99232 SBSQ HOSP IP/OBS MODERATE 35: CPT | Performed by: HOSPITALIST

## 2022-06-22 PROCEDURE — 80053 COMPREHEN METABOLIC PANEL: CPT

## 2022-06-22 PROCEDURE — 86850 RBC ANTIBODY SCREEN: CPT

## 2022-06-22 PROCEDURE — C1713 ANCHOR/SCREW BN/BN,TIS/BN: HCPCS | Performed by: ORTHOPAEDIC SURGERY

## 2022-06-22 DEVICE — SCREW LAG 10.5MM X 85MM (4TX2=8): Type: IMPLANTABLE DEVICE | Site: LEG | Status: FUNCTIONAL

## 2022-06-22 DEVICE — SCREW CROSS LOCK 5MM X 30MM (4TX5=20): Type: IMPLANTABLE DEVICE | Site: LEG | Status: FUNCTIONAL

## 2022-06-22 DEVICE — NAIL HIP 127.5 DEGREE 10MM X 180MM (4TX2=8): Type: IMPLANTABLE DEVICE | Site: LEG | Status: FUNCTIONAL

## 2022-06-22 RX ORDER — ONDANSETRON 2 MG/ML
INJECTION INTRAMUSCULAR; INTRAVENOUS PRN
Status: DISCONTINUED | OUTPATIENT
Start: 2022-06-22 | End: 2022-06-22 | Stop reason: SURG

## 2022-06-22 RX ORDER — METOPROLOL TARTRATE 1 MG/ML
1 INJECTION, SOLUTION INTRAVENOUS
Status: DISCONTINUED | OUTPATIENT
Start: 2022-06-22 | End: 2022-06-22 | Stop reason: HOSPADM

## 2022-06-22 RX ORDER — ACETAMINOPHEN 325 MG/1
650 TABLET ORAL EVERY 6 HOURS
Status: DISCONTINUED | OUTPATIENT
Start: 2022-06-22 | End: 2022-06-25 | Stop reason: HOSPADM

## 2022-06-22 RX ORDER — ENOXAPARIN SODIUM 100 MG/ML
40 INJECTION SUBCUTANEOUS
Status: DISCONTINUED | OUTPATIENT
Start: 2022-06-23 | End: 2022-06-25 | Stop reason: HOSPADM

## 2022-06-22 RX ORDER — HYDROMORPHONE HYDROCHLORIDE 1 MG/ML
0.2 INJECTION, SOLUTION INTRAMUSCULAR; INTRAVENOUS; SUBCUTANEOUS
Status: DISCONTINUED | OUTPATIENT
Start: 2022-06-22 | End: 2022-06-22 | Stop reason: HOSPADM

## 2022-06-22 RX ORDER — HYDROMORPHONE HYDROCHLORIDE 1 MG/ML
0.4 INJECTION, SOLUTION INTRAMUSCULAR; INTRAVENOUS; SUBCUTANEOUS
Status: DISCONTINUED | OUTPATIENT
Start: 2022-06-22 | End: 2022-06-22 | Stop reason: HOSPADM

## 2022-06-22 RX ORDER — HALOPERIDOL 5 MG/ML
1 INJECTION INTRAMUSCULAR
Status: DISCONTINUED | OUTPATIENT
Start: 2022-06-22 | End: 2022-06-22 | Stop reason: HOSPADM

## 2022-06-22 RX ORDER — SODIUM CHLORIDE, SODIUM LACTATE, POTASSIUM CHLORIDE, CALCIUM CHLORIDE 600; 310; 30; 20 MG/100ML; MG/100ML; MG/100ML; MG/100ML
INJECTION, SOLUTION INTRAVENOUS CONTINUOUS
Status: DISCONTINUED | OUTPATIENT
Start: 2022-06-22 | End: 2022-06-22 | Stop reason: HOSPADM

## 2022-06-22 RX ORDER — DIPHENHYDRAMINE HYDROCHLORIDE 50 MG/ML
12.5 INJECTION INTRAMUSCULAR; INTRAVENOUS
Status: DISCONTINUED | OUTPATIENT
Start: 2022-06-22 | End: 2022-06-22 | Stop reason: HOSPADM

## 2022-06-22 RX ORDER — CEFAZOLIN SODIUM 1 G/3ML
INJECTION, POWDER, FOR SOLUTION INTRAMUSCULAR; INTRAVENOUS PRN
Status: DISCONTINUED | OUTPATIENT
Start: 2022-06-22 | End: 2022-06-22 | Stop reason: SURG

## 2022-06-22 RX ORDER — LABETALOL HYDROCHLORIDE 5 MG/ML
5 INJECTION, SOLUTION INTRAVENOUS
Status: DISCONTINUED | OUTPATIENT
Start: 2022-06-22 | End: 2022-06-22 | Stop reason: HOSPADM

## 2022-06-22 RX ORDER — DEXAMETHASONE SODIUM PHOSPHATE 4 MG/ML
INJECTION, SOLUTION INTRA-ARTICULAR; INTRALESIONAL; INTRAMUSCULAR; INTRAVENOUS; SOFT TISSUE PRN
Status: DISCONTINUED | OUTPATIENT
Start: 2022-06-22 | End: 2022-06-22 | Stop reason: SURG

## 2022-06-22 RX ORDER — SODIUM CHLORIDE, SODIUM LACTATE, POTASSIUM CHLORIDE, CALCIUM CHLORIDE 600; 310; 30; 20 MG/100ML; MG/100ML; MG/100ML; MG/100ML
INJECTION, SOLUTION INTRAVENOUS
Status: DISCONTINUED | OUTPATIENT
Start: 2022-06-22 | End: 2022-06-22 | Stop reason: SURG

## 2022-06-22 RX ORDER — ALBUTEROL SULFATE 2.5 MG/3ML
2.5 SOLUTION RESPIRATORY (INHALATION)
Status: DISCONTINUED | OUTPATIENT
Start: 2022-06-22 | End: 2022-06-22 | Stop reason: HOSPADM

## 2022-06-22 RX ORDER — HYDROMORPHONE HYDROCHLORIDE 1 MG/ML
0.1 INJECTION, SOLUTION INTRAMUSCULAR; INTRAVENOUS; SUBCUTANEOUS
Status: DISCONTINUED | OUTPATIENT
Start: 2022-06-22 | End: 2022-06-22 | Stop reason: HOSPADM

## 2022-06-22 RX ORDER — ACETAMINOPHEN 325 MG/1
650 TABLET ORAL EVERY 6 HOURS PRN
Status: DISCONTINUED | OUTPATIENT
Start: 2022-06-27 | End: 2022-06-25 | Stop reason: HOSPADM

## 2022-06-22 RX ORDER — HYDRALAZINE HYDROCHLORIDE 20 MG/ML
5 INJECTION INTRAMUSCULAR; INTRAVENOUS
Status: DISCONTINUED | OUTPATIENT
Start: 2022-06-22 | End: 2022-06-22 | Stop reason: HOSPADM

## 2022-06-22 RX ADMIN — EPHEDRINE SULFATE 10 MG: 50 INJECTION INTRAMUSCULAR; INTRAVENOUS; SUBCUTANEOUS at 13:47

## 2022-06-22 RX ADMIN — FENTANYL CITRATE 25 MCG: 50 INJECTION, SOLUTION INTRAMUSCULAR; INTRAVENOUS at 15:00

## 2022-06-22 RX ADMIN — FENTANYL CITRATE 25 MCG: 50 INJECTION, SOLUTION INTRAMUSCULAR; INTRAVENOUS at 13:55

## 2022-06-22 RX ADMIN — HYDROCODONE BITARTRATE AND ACETAMINOPHEN 7.5 MG: 7.5; 325 SOLUTION ORAL at 15:01

## 2022-06-22 RX ADMIN — OXYCODONE 5 MG: 5 TABLET ORAL at 17:32

## 2022-06-22 RX ADMIN — PROPOFOL 70 MG: 10 INJECTION, EMULSION INTRAVENOUS at 13:39

## 2022-06-22 RX ADMIN — SENNOSIDES AND DOCUSATE SODIUM 2 TABLET: 50; 8.6 TABLET ORAL at 17:32

## 2022-06-22 RX ADMIN — SODIUM CHLORIDE, POTASSIUM CHLORIDE, SODIUM LACTATE AND CALCIUM CHLORIDE: 600; 310; 30; 20 INJECTION, SOLUTION INTRAVENOUS at 06:03

## 2022-06-22 RX ADMIN — ACETAMINOPHEN 650 MG: 325 TABLET, FILM COATED ORAL at 17:32

## 2022-06-22 RX ADMIN — ACETAMINOPHEN 1000 MG: 500 TABLET, FILM COATED ORAL at 06:00

## 2022-06-22 RX ADMIN — FENTANYL CITRATE 25 MCG: 50 INJECTION, SOLUTION INTRAMUSCULAR; INTRAVENOUS at 15:16

## 2022-06-22 RX ADMIN — AMLODIPINE BESYLATE 5 MG: 5 TABLET ORAL at 05:58

## 2022-06-22 RX ADMIN — ONDANSETRON 4 MG: 2 INJECTION INTRAMUSCULAR; INTRAVENOUS at 14:10

## 2022-06-22 RX ADMIN — FENTANYL CITRATE 25 MCG: 50 INJECTION, SOLUTION INTRAMUSCULAR; INTRAVENOUS at 13:58

## 2022-06-22 RX ADMIN — HYDROMORPHONE HYDROCHLORIDE 0.5 MG: 1 INJECTION, SOLUTION INTRAMUSCULAR; INTRAVENOUS; SUBCUTANEOUS at 00:39

## 2022-06-22 RX ADMIN — ONDANSETRON HYDROCHLORIDE 4 MG: 2 SOLUTION INTRAMUSCULAR; INTRAVENOUS at 00:28

## 2022-06-22 RX ADMIN — FENTANYL CITRATE 25 MCG: 50 INJECTION, SOLUTION INTRAMUSCULAR; INTRAVENOUS at 15:42

## 2022-06-22 RX ADMIN — DEXAMETHASONE SODIUM PHOSPHATE 4 MG: 4 INJECTION, SOLUTION INTRA-ARTICULAR; INTRALESIONAL; INTRAMUSCULAR; INTRAVENOUS; SOFT TISSUE at 13:55

## 2022-06-22 RX ADMIN — SODIUM CHLORIDE, POTASSIUM CHLORIDE, SODIUM LACTATE AND CALCIUM CHLORIDE: 600; 310; 30; 20 INJECTION, SOLUTION INTRAVENOUS at 15:17

## 2022-06-22 RX ADMIN — SODIUM CHLORIDE, POTASSIUM CHLORIDE, SODIUM LACTATE AND CALCIUM CHLORIDE: 600; 310; 30; 20 INJECTION, SOLUTION INTRAVENOUS at 13:48

## 2022-06-22 RX ADMIN — CEFAZOLIN 2 G: 330 INJECTION, POWDER, FOR SOLUTION INTRAMUSCULAR; INTRAVENOUS at 13:43

## 2022-06-22 RX ADMIN — FENTANYL CITRATE 50 MCG: 50 INJECTION, SOLUTION INTRAMUSCULAR; INTRAVENOUS at 13:39

## 2022-06-22 ASSESSMENT — PAIN DESCRIPTION - PAIN TYPE
TYPE: SURGICAL PAIN
TYPE: ACUTE PAIN;SURGICAL PAIN
TYPE: ACUTE PAIN

## 2022-06-22 ASSESSMENT — PAIN SCALES - GENERAL: PAIN_LEVEL: 0

## 2022-06-22 NOTE — ANESTHESIA TIME REPORT
Anesthesia Start and Stop Event Times     Date Time Event    6/22/2022 1257 Ready for Procedure     1331 Anesthesia Start     1422 Anesthesia Stop        Responsible Staff  06/22/22    Name Role Begin End    Toro Phan D.O. Anesth 1331 1422        Overtime Reason:  no overtime (within assigned shift)    Comments:

## 2022-06-22 NOTE — ED NOTES
Report from TALON Breaux  Pt resting in hospital bed, on monitor, IVF running, Call light in reach. No additional needs at this time.

## 2022-06-22 NOTE — OR NURSING
1420 Patient arrived to PACU from OR.  Report from anesthesia and RN.  Oral airway in place.  Dressings to left hip are clean, dry and soft.  1435 Patient arouses to voice, oral airway removed.  Patient denies pain or nausea.  1500 Patient medicated per MAR for pain.  1530 Patient reports an improvement in pain.  1542 Patient c/o pain, medicated per MAR.  1550 Patient sleeping at this time.  Report Shelbi HANLEY.  1617 Daughter called and updated, states she has he mothers belongings including her dentures.  1627 Patient transferred to [facility] via bed with transport.

## 2022-06-22 NOTE — ASSESSMENT & PLAN NOTE
Hemoglobin down from 12 to 7.2  Considering patient being symptomatic, she received IV iron along with PRBC transfusion.

## 2022-06-22 NOTE — PROGRESS NOTES
Hospital Medicine Daily Progress Note    Date of Service  6/22/2022    Chief Complaint  Leslie Friend is a 87 y.o. female admitted 6/21/2022 with GLF    Hospital Course  87-year-old female with past medical history of hypertension presenting after a ground-level fall resulting in hip pain. She was found to have a acute left intertrochanteric femoral fracture and orthopedics was consulted and patient plan for intervention on 6/22.  She has developed acute blood loss anemia after femoral fracture with hemoglobin down to 8.      Interval Problem Update  Hemoglobin down, she denies any lightheadedness or dizziness  She is not having any hip pain as long as she does not move her leg  Family at bedside and all questions answered.  I did let them know that she may require blood transfusion if hemoglobin continues to drop    I have discussed this patient's plan of ca as long as she does not move her hip  Family at bedside and all questions answeredre and discharge plan at IDT rounds today with Case Management, Nursing, Nursing leadership, and other members of the IDT team.    Consultants/Specialty  orthopedics    Code Status  DNAR, I OK    Disposition  Patient is not medically cleared for discharge.   Anticipate discharge to TBD.  I have placed the appropriate orders for post-discharge needs.    Review of Systems  ROS     Physical Exam  Temp:  [37.7 °C (99.9 °F)] 37.7 °C (99.9 °F)  Pulse:  [] 71  Resp:  [16-56] 21  BP: ()/() 139/63  SpO2:  [90 %-99 %] 99 %    Physical Exam    Fluids    Intake/Output Summary (Last 24 hours) at 6/22/2022 0906  Last data filed at 6/21/2022 2236  Gross per 24 hour   Intake --   Output 100 ml   Net -100 ml       Laboratory  Recent Labs     06/21/22  1245 06/22/22  0232   WBC 11.1* 9.0   RBC 3.82* 2.70*   HEMOGLOBIN 12.0 8.3*   HEMATOCRIT 35.9* 26.2*   MCV 94.0 97.0   MCH 31.4 30.7   MCHC 33.4* 31.7*   RDW 53.3* 55.0*   PLATELETCT 147* 118*   MPV 11.5 11.9      Recent Labs     06/21/22  1245 06/22/22  0232   SODIUM 142 139   POTASSIUM 3.4* 3.9   CHLORIDE 108 109   CO2 23 21   GLUCOSE 121* 126*   BUN 28* 25*   CREATININE 0.60 0.54   CALCIUM 9.5 8.0*     Recent Labs     06/21/22  1245   APTT 29.8   INR 1.17*               Imaging  DX-CHEST-PORTABLE (1 VIEW)   Final Result      No evidence of acute cardiopulmonary process.      DX-FEMUR-2+ LEFT   Final Result      Acute left intratrochanteric fracture, with slight varus angulation.      EC-ECHOCARDIOGRAM COMPLETE W/O CONT    (Results Pending)   DX-PORTABLE FLUORO > 1 HOUR    (Results Pending)   DX-FEMUR-2+ LEFT    (Results Pending)        Assessment/Plan  * Intertrochanteric fracture of femur (HCC)- (present on admission)  Assessment & Plan  S/p GLF  Orthopedic intervention 6/22  Pt/OT post op  Pain management    Acute blood loss anemia  Assessment & Plan  Hemoglobin down from 12 to 8.3  Trend hemoglobin, transfuse if hemoglobin less than 7    Hypertension- (present on admission)  Assessment & Plan  BP stable  Cont norvasc  PRN vasotec and hydralazine       VTE prophylaxis: SCDs/TEDs    I have performed a physical exam and reviewed and updated ROS and Plan today (6/22/2022). In review of yesterday's note (6/21/2022), there are no changes except as documented above.

## 2022-06-22 NOTE — ED NOTES
Pt's daughter at bedside, pt is eating dinner, denies new pain.   Pt medicated per MAR for HTN.  MD called

## 2022-06-22 NOTE — HOSPITAL COURSE
This is a 87-year-old female with a past medical significant for hypertension presented to ER on 6/21/122 after a ground-level fall resulting in the right-sided hip pain.  Imaging showed acute left intratrochanteric femoral fracture, surgery was consulted, patient underwent surgical treatment of the left intertrochanteric femur fracture with intramedullary device by Dr Santiago  on 6/22/2022.    Orthopedic surgery, patient can bear weight as tolerated.  PT/OT has evaluated the patient recommending home health along with front wheel walker.  Unable to bear weight, the patient does not have a primary care physician.  Will provide a walker.  Patient will follow up with orthopedic surgery as an outpatient.    On hospital which was complicated by acute blood loss anemia, patient noted with hemoglobin of 7.2, received 1 unit of PRBC transfusion along with IV iron.  Today her hemoglobin is noted to be at 9.8.  She was noted to have low platelet at 150, monitor, not actively bleeding.      Also echocardiogram was obtained, patient is noted to have severe aortic stenosis, cardiology consulted, pending evaluation    Patient states that she does live with her daughter and daughter will be able to take care of patient.  She does have a primary care physician appointment on Wednesday which is 6/29/2022

## 2022-06-22 NOTE — ANESTHESIA POSTPROCEDURE EVALUATION
Patient: Leslie Friend    Procedure Summary     Date: 06/22/22 Room / Location: [place] [id number] / SURGERY [facility]    Anesthesia Start: 1331 Anesthesia Stop: 1422    Procedure: LEFT FEMUR SHORT NAIL (Leg Upper) Diagnosis: (LEFT FEMUR FRACTURE)    Surgeons: Ashok Santiago M.D. Responsible Provider: Toro Phan D.O.    Anesthesia Type: general ASA Status: 2          Final Anesthesia Type: general  Last vitals  BP   Blood Pressure : (!) 163/73    Temp   36.7 °C (98.1 °F)    Pulse   89   Resp   20    SpO2   100 %      Anesthesia Post Evaluation    Patient location during evaluation: PACU  Patient participation: complete - patient participated  Level of consciousness: awake and alert  Pain score: 0    Airway patency: patent  Anesthetic complications: no  Cardiovascular status: hemodynamically stable  Respiratory status: acceptable  Hydration status: euvolemic    PONV: none          No complications documented.     Nurse Pain Score: 0 (NPRS)

## 2022-06-22 NOTE — PROGRESS NOTES
Received report and assumed care of patient.     2030 - Patient's family has questions regarding how long the surgery will take and whether or not patient will need to undergo anesthesia or if a local could be done. MD notified. Patient's grandson, Jluis's, phone number voalted to MD to contact.

## 2022-06-22 NOTE — ASSESSMENT & PLAN NOTE
S/p GLF  Orthopedic  surgery was consulted, patient underwent surgical treatment of the left intertrochanteric femur fracture with intramedullary device by Dr Santiago  on 6/22/2022.   --  patient can bear weight as tolerated.  PT /OT recs home health  dvt ppx

## 2022-06-22 NOTE — OP REPORT
DATE OF OPERATION: 6/22/2022     PREOPERATIVE DIAGNOSIS: left intertrochanteric femur fracture    POSTOPERATIVE DIAGNOSIS: Same    PROCEDURE PERFORMED: Surgical treatment of left intertrochanteric femur fracture with intramedullary device    SURGEON: Ashok Santiago M.D.     ASSISTANT: none    ANESTHESIA: General    SPECIMEN: None    ESTIMATED BLOOD LOSS: 10 mL      INDICATIONS: The patient is a 87 y.o. female with a left intertrochanteric femur fracture resulting from a ground level fall.  The patient denies antecedent pain, and was found to have a normal neurovascular exam and intact skin envelope.  Radiographs demonstrated the intertrochanteric femur fracture.  Given these findings, the patient is an appropriately indicated candidate for surgical treatment of the intertrochanteric fracture with an intramedullary device.  I discussed the risks and benefits of the procedure, including the risks of infection, wound healing complication, neurovascular injury, persistent hip pain, malunion, non-union, malrotation, and the medical risks of anesthesia including MI, stroke, and death.  Benefits include early mobilization, improved chance of union, and reduction in the medical risks of hip fractures.  Alternatives to surgery were also discussed, including non-operative management, which I did not recommend.  The patient was in agreement with the plan to proceed, and the informed consent was signed and documented.  I met with the patient pre-operatively and marked the operative extremity with their agreement.  We proceeded to the operating room.     DESCRIPTION OF PROCEDURE:  The patient was seen in the preoperative holding area on the date of surgery.  The operative hip was marked with the surgeon's initials.  The patient was taken to the operating room and placed supine on the operating table.  Anesthesia was induced.  Both feet were secured in the ski boot foot holders.  A preliminary reduction was done under image  intensifier imaging.  Traction and manipulation were utilized to obtain an anatomic position.  At this point, the right hip was prepped and draped in normal sterile fashion.  Operative pause was conducted to the correct patient, side, site, and procedure were identified as well as surgeon's initial on the operative extremity.  A guide pin was placed percutaneously to the start point on the proximal femur.  This was advanced into the intramedullary space.  Next, a skin incision was made with over this guide pin and an opening drill was used to gain access to the intramedullary space.    A short hip nail was placed through this opening and advanced to the correct depth for placement of the cephalomedullary screw.  Using the outrigger guide, we placed a cephalomedullary screw over a guide pin aiming for the center-center of the femoral head.  Once this was fully placed, I used the screw to adjust the rotation of the head neck component of the fracture to its anatomic location and then further compressed across the fracture using a compression wheel.  This was locked in place with proximal set screw.  We then used the outrigger guide to place a bicortical interlocking screw distally.  Once this had been drilled and placed, final fluoroscopic images were obtained and showed anatomic reduction of the right hip.  The wounds were then thoroughly irrigated and closed in layered fashion with 2-0 Vicryl and staples.  Sterile dressings were placed.  The patient was allowed to awaken in the operating room and taken to the PACU in stable condition.     POSTOPERATIVE PLAN:  Weight bearing as tolerated.  Mobilize with physical and occupational therapies.  DVT prophylaxis with SCDs and Lovenox until mobilizing independently and then can be switched to aspirin for 4 weeks.  The patient will follow up in clinic in 2 weeks to check wounds and remove staples.      ____________________________________   Ashok Santiago M.D.   DD:  6/22/2022  2:38 PM

## 2022-06-22 NOTE — ANESTHESIA PREPROCEDURE EVALUATION
Case: [id number] Date/Time: 06/22/22 1017    Procedure: LEFT FEMUR SHORT NAIL    Location: [facility] OR 14 / SURGERY [facility]    Surgeons: Ashok Santiago M.D.          Relevant Problems   CARDIAC   (positive) Hypertension       Physical Exam    Airway   Mallampati: II  TM distance: >3 FB  Neck ROM: full       Cardiovascular - normal exam  Rhythm: regular  Rate: normal  (-) murmur     Dental - normal exam           Pulmonary - normal exam  Breath sounds clear to auscultation     Abdominal    Neurological - normal exam                 Anesthesia Plan    ASA 2       Plan - general       Airway plan will be LMA          Induction: intravenous    Postoperative Plan: Postoperative administration of opioids is intended.    Pertinent diagnostic labs and testing reviewed    Informed Consent:    Anesthetic plan and risks discussed with patient.    Use of blood products discussed with: patient whom consented to blood products.

## 2022-06-23 ENCOUNTER — APPOINTMENT (OUTPATIENT)
Dept: CARDIOLOGY | Facility: MEDICAL CENTER | Age: 87
DRG: 481 | End: 2022-06-23
Attending: STUDENT IN AN ORGANIZED HEALTH CARE EDUCATION/TRAINING PROGRAM
Payer: MEDICARE

## 2022-06-23 ENCOUNTER — APPOINTMENT (OUTPATIENT)
Dept: RADIOLOGY | Facility: MEDICAL CENTER | Age: 87
DRG: 481 | End: 2022-06-23
Attending: HOSPITALIST
Payer: MEDICARE

## 2022-06-23 PROBLEM — E87.6 HYPOKALEMIA: Status: ACTIVE | Noted: 2022-06-23

## 2022-06-23 PROBLEM — D64.9 ANEMIA: Status: ACTIVE | Noted: 2022-06-23

## 2022-06-23 LAB
ANION GAP SERPL CALC-SCNC: 7 MMOL/L (ref 7–16)
BUN SERPL-MCNC: 30 MG/DL (ref 8–22)
CALCIUM SERPL-MCNC: 8.9 MG/DL (ref 8.5–10.5)
CHLORIDE SERPL-SCNC: 106 MMOL/L (ref 96–112)
CO2 SERPL-SCNC: 22 MMOL/L (ref 20–33)
CREAT SERPL-MCNC: 0.74 MG/DL (ref 0.5–1.4)
EKG IMPRESSION: NORMAL
ERYTHROCYTE [DISTWIDTH] IN BLOOD BY AUTOMATED COUNT: 55 FL (ref 35.9–50)
GFR SERPLBLD CREATININE-BSD FMLA CKD-EPI: 78 ML/MIN/1.73 M 2
GLUCOSE SERPL-MCNC: 128 MG/DL (ref 65–99)
HCT VFR BLD AUTO: 26.3 % (ref 37–47)
HGB BLD-MCNC: 8.3 G/DL (ref 12–16)
IRON SATN MFR SERPL: 9 % (ref 15–55)
IRON SERPL-MCNC: 19 UG/DL (ref 40–170)
MCH RBC QN AUTO: 31.3 PG (ref 27–33)
MCHC RBC AUTO-ENTMCNC: 31.6 G/DL (ref 33.6–35)
MCV RBC AUTO: 99.2 FL (ref 81.4–97.8)
NT-PROBNP SERPL IA-MCNC: 868 PG/ML (ref 0–125)
PHOSPHATE SERPL-MCNC: 4.3 MG/DL (ref 2.5–4.5)
PLATELET # BLD AUTO: 133 K/UL (ref 164–446)
PMV BLD AUTO: 11 FL (ref 9–12.9)
POTASSIUM SERPL-SCNC: 5.2 MMOL/L (ref 3.6–5.5)
PROCALCITONIN SERPL-MCNC: 0.24 NG/ML
RBC # BLD AUTO: 2.65 M/UL (ref 4.2–5.4)
SODIUM SERPL-SCNC: 135 MMOL/L (ref 135–145)
TIBC SERPL-MCNC: 220 UG/DL (ref 250–450)
TROPONIN T SERPL-MCNC: 22 NG/L (ref 6–19)
TSH SERPL DL<=0.005 MIU/L-ACNC: 0.06 UIU/ML (ref 0.38–5.33)
UIBC SERPL-MCNC: 201 UG/DL (ref 110–370)
WBC # BLD AUTO: 12 K/UL (ref 4.8–10.8)

## 2022-06-23 PROCEDURE — 99233 SBSQ HOSP IP/OBS HIGH 50: CPT | Performed by: HOSPITALIST

## 2022-06-23 PROCEDURE — 83550 IRON BINDING TEST: CPT

## 2022-06-23 PROCEDURE — 93005 ELECTROCARDIOGRAM TRACING: CPT | Performed by: HOSPITALIST

## 2022-06-23 PROCEDURE — 700102 HCHG RX REV CODE 250 W/ 637 OVERRIDE(OP): Performed by: STUDENT IN AN ORGANIZED HEALTH CARE EDUCATION/TRAINING PROGRAM

## 2022-06-23 PROCEDURE — 770001 HCHG ROOM/CARE - MED/SURG/GYN PRIV*

## 2022-06-23 PROCEDURE — 36415 COLL VENOUS BLD VENIPUNCTURE: CPT

## 2022-06-23 PROCEDURE — 84145 PROCALCITONIN (PCT): CPT

## 2022-06-23 PROCEDURE — 84443 ASSAY THYROID STIM HORMONE: CPT

## 2022-06-23 PROCEDURE — A9270 NON-COVERED ITEM OR SERVICE: HCPCS | Performed by: ORTHOPAEDIC SURGERY

## 2022-06-23 PROCEDURE — 97162 PT EVAL MOD COMPLEX 30 MIN: CPT

## 2022-06-23 PROCEDURE — 93010 ELECTROCARDIOGRAM REPORT: CPT | Performed by: INTERNAL MEDICINE

## 2022-06-23 PROCEDURE — 700105 HCHG RX REV CODE 258: Performed by: HOSPITALIST

## 2022-06-23 PROCEDURE — 83880 ASSAY OF NATRIURETIC PEPTIDE: CPT

## 2022-06-23 PROCEDURE — 700102 HCHG RX REV CODE 250 W/ 637 OVERRIDE(OP): Performed by: ORTHOPAEDIC SURGERY

## 2022-06-23 PROCEDURE — 83540 ASSAY OF IRON: CPT

## 2022-06-23 PROCEDURE — 84100 ASSAY OF PHOSPHORUS: CPT

## 2022-06-23 PROCEDURE — 84484 ASSAY OF TROPONIN QUANT: CPT

## 2022-06-23 PROCEDURE — 70450 CT HEAD/BRAIN W/O DYE: CPT | Mod: ME

## 2022-06-23 PROCEDURE — 85027 COMPLETE CBC AUTOMATED: CPT

## 2022-06-23 PROCEDURE — 97165 OT EVAL LOW COMPLEX 30 MIN: CPT

## 2022-06-23 PROCEDURE — 80048 BASIC METABOLIC PNL TOTAL CA: CPT

## 2022-06-23 PROCEDURE — A9270 NON-COVERED ITEM OR SERVICE: HCPCS | Performed by: STUDENT IN AN ORGANIZED HEALTH CARE EDUCATION/TRAINING PROGRAM

## 2022-06-23 RX ORDER — SODIUM CHLORIDE 9 MG/ML
INJECTION, SOLUTION INTRAVENOUS CONTINUOUS
Status: DISCONTINUED | OUTPATIENT
Start: 2022-06-23 | End: 2022-06-24

## 2022-06-23 RX ADMIN — ACETAMINOPHEN 650 MG: 325 TABLET, FILM COATED ORAL at 17:19

## 2022-06-23 RX ADMIN — SENNOSIDES AND DOCUSATE SODIUM 2 TABLET: 50; 8.6 TABLET ORAL at 06:04

## 2022-06-23 RX ADMIN — ACETAMINOPHEN 650 MG: 325 TABLET, FILM COATED ORAL at 00:52

## 2022-06-23 RX ADMIN — AMLODIPINE BESYLATE 5 MG: 5 TABLET ORAL at 06:03

## 2022-06-23 RX ADMIN — SENNOSIDES AND DOCUSATE SODIUM 2 TABLET: 50; 8.6 TABLET ORAL at 17:19

## 2022-06-23 RX ADMIN — POLYETHYLENE GLYCOL 3350 1 PACKET: 17 POWDER, FOR SOLUTION ORAL at 09:25

## 2022-06-23 RX ADMIN — OXYCODONE 5 MG: 5 TABLET ORAL at 06:20

## 2022-06-23 RX ADMIN — MAGNESIUM HYDROXIDE 30 ML: 400 SUSPENSION ORAL at 17:20

## 2022-06-23 RX ADMIN — ACETAMINOPHEN 650 MG: 325 TABLET, FILM COATED ORAL at 06:03

## 2022-06-23 RX ADMIN — SODIUM CHLORIDE: 9 INJECTION, SOLUTION INTRAVENOUS at 09:19

## 2022-06-23 ASSESSMENT — COGNITIVE AND FUNCTIONAL STATUS - GENERAL
MOVING FROM LYING ON BACK TO SITTING ON SIDE OF FLAT BED: A LITTLE
PERSONAL GROOMING: A LITTLE
HELP NEEDED FOR BATHING: A LOT
TOILETING: A LITTLE
SUGGESTED CMS G CODE MODIFIER DAILY ACTIVITY: CK
WALKING IN HOSPITAL ROOM: A LITTLE
MOBILITY SCORE: 18
DRESSING REGULAR UPPER BODY CLOTHING: A LITTLE
TURNING FROM BACK TO SIDE WHILE IN FLAT BAD: A LITTLE
CLIMB 3 TO 5 STEPS WITH RAILING: A LITTLE
STANDING UP FROM CHAIR USING ARMS: A LITTLE
MOVING TO AND FROM BED TO CHAIR: A LITTLE
DRESSING REGULAR LOWER BODY CLOTHING: A LOT
DAILY ACTIVITIY SCORE: 16
SUGGESTED CMS G CODE MODIFIER MOBILITY: CK
EATING MEALS: A LITTLE

## 2022-06-23 ASSESSMENT — ENCOUNTER SYMPTOMS
DEPRESSION: 0
HEARTBURN: 0
PALPITATIONS: 0
CLAUDICATION: 0
VOMITING: 0
DIARRHEA: 0
SORE THROAT: 0
SPUTUM PRODUCTION: 0
DIZZINESS: 1
ABDOMINAL PAIN: 0
BLURRED VISION: 0
DOUBLE VISION: 0
MYALGIAS: 0
NAUSEA: 0
NERVOUS/ANXIOUS: 1

## 2022-06-23 ASSESSMENT — PAIN DESCRIPTION - PAIN TYPE
TYPE: SURGICAL PAIN

## 2022-06-23 ASSESSMENT — GAIT ASSESSMENTS
DISTANCE (FEET): 80
GAIT LEVEL OF ASSIST: CONTACT GUARD ASSIST
ASSISTIVE DEVICE: FRONT WHEEL WALKER

## 2022-06-23 ASSESSMENT — LIFESTYLE VARIABLES
CONSUMPTION TOTAL: NEGATIVE
TOTAL SCORE: 0
EVER FELT BAD OR GUILTY ABOUT YOUR DRINKING: NO
TOTAL SCORE: 0
ON A TYPICAL DAY WHEN YOU DRINK ALCOHOL HOW MANY DRINKS DO YOU HAVE: 0
ALCOHOL_USE: NO
TOTAL SCORE: 0
HAVE PEOPLE ANNOYED YOU BY CRITICIZING YOUR DRINKING: NO
HOW MANY TIMES IN THE PAST YEAR HAVE YOU HAD 5 OR MORE DRINKS IN A DAY: 0
AVERAGE NUMBER OF DAYS PER WEEK YOU HAVE A DRINK CONTAINING ALCOHOL: 0
HAVE YOU EVER FELT YOU SHOULD CUT DOWN ON YOUR DRINKING: NO
EVER HAD A DRINK FIRST THING IN THE MORNING TO STEADY YOUR NERVES TO GET RID OF A HANGOVER: NO

## 2022-06-23 ASSESSMENT — FIBROSIS 4 INDEX: FIB4 SCORE: 3.94

## 2022-06-23 ASSESSMENT — PATIENT HEALTH QUESTIONNAIRE - PHQ9
1. LITTLE INTEREST OR PLEASURE IN DOING THINGS: NOT AT ALL
SUM OF ALL RESPONSES TO PHQ9 QUESTIONS 1 AND 2: 0
2. FEELING DOWN, DEPRESSED, IRRITABLE, OR HOPELESS: NOT AT ALL

## 2022-06-23 ASSESSMENT — ACTIVITIES OF DAILY LIVING (ADL): TOILETING: INDEPENDENT

## 2022-06-23 NOTE — DISCHARGE PLANNING
[facility] Transitional Care Coordination    Referral from:  Dr. Santiago  Insurance Provider on Facesheet: Medicare  Potential Rehab Diagnosis: Unilateral hip fracture    Chart review indicates patient has on going medical management and may have therapy needs to possibly meet inpatient rehab facility criteria with the goal of returning to community.    D/C support: Family - to be determined     Physiatry consultation pended while waiting for additional information.  POD 1 surgical repair L hip fracture.  Would welcome PT/OT as clinically appropriate.  TCC will follow.  Please reach out sooner if PMR consult requested for medical management.      Last Covid test:       Thank you for the referral.

## 2022-06-23 NOTE — CARE PLAN
The patient is Stable - Low risk of patient condition declining or worsening    Shift Goals  Clinical Goals: pain control, safety  Patient Goals: pain control  Family Goals: not present    Progress made toward(s) clinical / shift goals:  Pt monitored for signs of decline. Pt denied pain throughout the night. Family by bedside. Pt did not tolerate ambulating to bedside commode well. Pt monitored closely for signs of decline.    Patient is not progressing towards the following goals:      Problem: Pain - Standard  Goal: Alleviation of pain or a reduction in pain to the patient’s comfort goal  Outcome: Progressing     Problem: Fall Risk  Goal: Patient will remain free from falls  Outcome: Progressing     Problem: Knowledge Deficit - Standard  Goal: Patient and family/care givers will demonstrate understanding of plan of care, disease process/condition, diagnostic tests and medications  Outcome: Progressing     Problem: Skin Integrity  Goal: Skin integrity is maintained or improved  Outcome: Progressing

## 2022-06-23 NOTE — FACE TO FACE
Face to Face Supporting Documentation - Home Health    The encounter with this patient was in whole or in part the primary reason for home health admission.    Date of encounter:   Patient:                    MRN:                       YOB: 2022  Leslie Friend  [id number]  1935     Home health to see patient for:  Skilled Nursing care for assessment, interventions & education, Wound Care, Physical Therapy evaluation and treatment and Occupational therapy evaluation and treatment    Skilled need for:  Medication Management medication management     Skilled nursing interventions to include:  Wound Care    Homebound status evidenced by:  Need the aid of supportive devices such as crutches, canes, wheelchairs or walkers. Leaving home requires a considerable and taxing effort. There is a normal inability to leave the home.    Community Physician to provide follow up care: Pcp Pt States None     Optional Interventions? No      I certify the face to face encounter for this home health care referral meets the CMS requirements and the encounter/clinical assessment with the patient was, in whole, or in part, for the medical condition(s) listed above, which is the primary reason for home health care. Based on my clinical findings: the service(s) are medically necessary, support the need for home health care, and the homebound criteria are met.  I certify that this patient has had a face to face encounter by myself.  Odalis Dickey M.D. - NPI: [id number]

## 2022-06-23 NOTE — THERAPY
Physical Therapy   Initial Evaluation     Patient Name: Leslie Friend  Age:  87 y.o., Sex:  female  Medical Record #: [id number]  Today's Date: 6/23/2022     Precautions  Precautions: Fall Risk;Weight Bearing As Tolerated Left Lower Extremity    Assessment  Patient is an 87 y.o. female who was admitted after a GLF resulting in a L femoral fx, now s/o IM fixation. PMH significant for HTN, osteoporosis, anemia. Pt received in bed with family present and agreeable to PT evaluation. Pt required min A for sup>sit, CGA for transfers, and CGA to ambulate 80ft with a FWW. Pt's daughter reports she will be able to care for the pt upon return home and would prefer that over a rehab facility. Pt will benefit from additional PT while in house and anticipate return home likely tomorrow with HHPT and a FWW.    Plan    Recommend Physical Therapy 5 times per week until therapy goals are met for the following treatments:  Bed Mobility, Gait Training, Neuro Re-Education / Balance, Self Care/Home Evaluation, Stair Training, Therapeutic Activities, and Therapeutic Exercises    DC Equipment Recommendations: Front-Wheel Walker  Discharge Recommendations: Recommend home health for continued physical therapy services     Objective       06/23/22 1046   Initial Contact Note    Initial Contact Note Order Received and Verified, Physical Therapy Evaluation in Progress with Full Report to Follow.   Precautions   Precautions Fall Risk;Weight Bearing As Tolerated Left Lower Extremity   Pain 0 - 10 Group   Therapist Pain Assessment Post Activity Pain Same as Prior to Activity;Nurse Notified  (min c/o pain)   Prior Living Situation   Prior Services Home-Independent   Housing / Facility 1 Story House   Steps Into Home 1   Steps In Home 0   Equipment Owned None   Lives with - Patient's Self Care Capacity Spouse;Adult Children   Comments Pt's daughter present and reporting she is home all day to take care of the pt's  and can also  assist her if needed. She reports preference to have pt return home so she can help her.   Prior Level of Functional Mobility   Comments Independent PTA with no AD   History of Falls   History of Falls Yes   Date of Last Fall   (reason for admission)   Cognition    Level of Consciousness Alert   Comments Pleasant and cooperative   Passive ROM Lower Body   Passive ROM Lower Body WDL   Active ROM Lower Body    Active ROM Lower Body  WDL   Strength Lower Body   Comments Mild LLE weakness, expected post-op. No buckling in standing   Sensation Lower Body   Comments Denies sensory changes   Coordination Lower Body    Coordination Lower Body  WDL   Balance Assessment   Sitting Balance (Static) Fair +   Sitting Balance (Dynamic) Fair   Standing Balance (Static) Fair -   Standing Balance (Dynamic) Fair -   Weight Shift Sitting Good   Weight Shift Standing Fair   Comments FWW in standing   Gait Analysis   Gait Level Of Assist Contact Guard Assist   Assistive Device Front Wheel Walker   Distance (Feet) 80   # of Times Distance was Traveled 1   Deviation Antalgic;Decreased Toe Off;Decreased Heel Strike;Bradykinetic  (mildly antalgic)   Bed Mobility    Supine to Sit Minimal Assist   Sit to Supine   (up in chair)   Scooting Supervised   Functional Mobility   Sit to Stand Contact Guard Assist   Bed, Chair, Wheelchair Transfer Contact Guard Assist   Transfer Method Stand Step   How much difficulty does the patient currently have...   Turning over in bed (including adjusting bedclothes, sheets and blankets)? 3   Sitting down on and standing up from a chair with arms (e.g., wheelchair, bedside commode, etc.) 3   Moving from lying on back to sitting on the side of the bed? 3   How much help from another person does the patient currently need...   Moving to and from a bed to a chair (including a wheelchair)? 3   Need to walk in a hospital room? 3   Climbing 3-5 steps with a railing? 3   6 clicks Mobility Score 18   Short Term Goals     Short Term Goal # 1 Pt will perform transfers with FWW and supervision to progress mobility in 6 visits.   Short Term Goal # 2 Pt will ambulate 100ft with FWW and supervision to progress mobility in 6 visits.   Short Term Goal # 3 Pt will ascend 1 step with min A to progress mobility in 6 visits.   Education Group   Education Provided Role of Physical Therapist   Role of Physical Therapist Patient Response Patient;Family;Acceptance;Explanation;Verbal Demonstration   Anticipated Discharge Equipment and Recommendations   DC Equipment Recommendations Front-Wheel Walker   Discharge Recommendations Recommend home health for continued physical therapy services   Interdisciplinary Plan of Care Collaboration   IDT Collaboration with  Family / Caregiver;Nursing;Occupational Therapist   Patient Position at End of Therapy Seated;Family / Friend in Room;Tray Table within Reach;Call Light within Reach   Collaboration Comments RN updated   Session Information   Date / Session Number  6/23-1(1/5, 6/29)

## 2022-06-23 NOTE — DISCHARGE PLANNING
.Received Choice form at 8851  Agency/Facility Name: [facility]  Referral sent per Choice form @ [phone]

## 2022-06-23 NOTE — DIETARY
Nutrition services: Day 2 of admit.  Leslie Friend is a 87 y.o. female with admitting DX of Atypical femoral fracture.    Consult received for failure to thrive (FTT)      Assessment:  Height: 152.4 cm (5')  Body mass index is 19.53 kg/m²., BMI classification: WNL but most likely low based on pt's age. BMI between 23 and 27 recommended in the elderly.   Diet/Intake: renal/<25%    Evaluation:   1. RD met with pt in her room. Family present. Dtr said that she could translate for this RD. Pt's wt has been stable overall with only 3 lb fluctuations. UBW is ~80 lb. No report of poor PO PTA. Per H&P, pt reported good appetite. Per dtr, pt has never been a big eater and she has always been thin. She drinks Ensure and Yakult supplements at home. PO intake currently is poor per PO flow sheets with intake of < 25% recorded. Pt currently on renal therapeutic diet due to potassium trending up to 5.2. RD requested change in diet to regular with low potassium. MD agreed. Pt also agreed to Boost supplements (strawberry or vanilla) TID with meals.    2.  Pt currently does not have a weight in Epic for this hospitalization. Nurse agreed to weigh pt. Per wt hx in [facility], pt with recent urgent care visit with wt noted of 45.4 kg (100 lb). This weight is > than pt's reported UBW of ~80 lb.     Malnutrition Risk: criteria not met    Recommendations/Plan:  1. Add vanilla or strawberry Boost Glucose Control (low potassium) to meals TID  2. Liberalization of diet to regular with low potassium foods per MD  3. Encourage intake of >25-50%  4. Document intake of all meals and supplements  as % taken in ADL's to provide interdisciplinary communication across all shifts.   5. Monitor weight.  6. Nutrition rep will continue to see patient for ongoing meal and snack preferences.           RD will follow

## 2022-06-23 NOTE — DOCUMENTATION QUERY
[facility]                                                                       Query Response Note      PATIENT:               CAMPBELL QUINTANILLA  ACCT #:                  [id number]  MRN:                     [id number]  :                      1935  ADMIT DATE:       2022 11:28 AM  DISCH DATE:          RESPONDING  PROVIDER #:        [id number]           QUERY TEXT:    Osteoporosis is documented in the H&P however, is not documented in the subsequent Progress Note. Please clarify the status of osteoporosis and type of fracture.    NOTE:  If an appropriate response is not listed below, please respond with a new note.      The patient's Clinical Indicators include:    DX-femur: Acute L intratrochanteric fracture w/ slight varus angulation.  25-Hydroxy Vit D: 23  H&P: L femur fx; GLF d/t lightheadedness; HTN; malnutrition; osteoporosis     Op Report: IM device L intertrochanteric femur fx    Treatment: Orthopedic consult; IM device L femur fx; lab/imaging  Risk Factors: Advanced age; GLF; malnutrition    Thank You,  Missy Weber RN  Clinical    Connect via [employer]  Options provided:   -- Traumatic fracture, osteoporosis ruled out   -- Traumatic fracture, osteoporosis remains ruled in   -- Pathological fracture secondary to osteoporosis   -- Other explanation, (please specify other explanation)   -- Unable to determine      Query created by: Missy Weber on 2022 8:15 AM    RESPONSE TEXT:    Traumatic fracture, osteoporosis remains ruled in          Electronically signed by:  BUNNY MANDUJANO MD 2022 2:36 PM

## 2022-06-23 NOTE — PROGRESS NOTES
[facility] Medicine Daily Progress Note    Date of Service  6/23/2022    Chief Complaint  Leslie Friend is a 87 y.o. female admitted 6/21/2022 with   Chief Complaint   Patient presents with   • Sent from Urgent Care     Pt had a GLF on 6/19. Pt was unable to walk afterwards, pt with extreme pain to the left leg. - loc, pt takes daily aspirin, - head strike. Today pt presents to urgent care and told pt's daughter she has a broken hip and needed to come to ER for evaluation         Hospital Course      This is a 87-year-old female with a past medical significant for hypertension presented to ER on 6/21/122 after a ground-level fall resulting in the right-sided hip pain.  Imaging showed acute left intratrochanteric femoral fracture, surgery was consulted, patient underwent surgical treatment of the left intertrochanteric femur fracture with intramedullary device by Dr Santiago  on 6/22/2022.    Orthopedic surgery, patient can bear weight as tolerated. Pending PT /OT, will continue DVT prophylaxis.    Interval events:  -- No acute events overnight, medicine has been stable, heart rate 78-94, blood pressure elevated 145/86, saturating 100% on 1 L of oxygen  --Noted to have elevated WBC to 12, will obtain procalcitonin, hemoglobin at 8.3, monitor, if less than 7, transfuse.  Patient is noted to have macrocytosis with MCV of 98.2, will obtain vitamin B12, TSH  Platelet 133, no WBC, continue to monitor.  --, Will obtain PT/OT orthopedic surgery following    I have discussed this patient's plan of care and discharge plan at IDT rounds today with Case Management, Nursing, Nursing leadership, and other members of the IDT team.    Consultants/Specialty  orthopedics    Code Status  DNAR, I OK    Disposition  Patient is not medically cleared for discharge.   Anticipate discharge to to skilled nursing facility.  I have placed the appropriate orders for post-discharge needs.    Review of Systems  Review of Systems    Constitutional: Negative for malaise/fatigue.   HENT: Negative for congestion and sore throat.    Eyes: Negative for blurred vision and double vision.   Respiratory: Negative for sputum production.    Cardiovascular: Negative for chest pain, palpitations and claudication.   Gastrointestinal: Negative for abdominal pain, diarrhea, heartburn, nausea and vomiting.   Genitourinary: Negative for dysuria.   Musculoskeletal: Negative for myalgias.   Neurological: Positive for dizziness.   Psychiatric/Behavioral: Negative for depression. The patient is nervous/anxious.    All other systems reviewed and are negative.       Physical Exam  Temp:  [36.4 °C (97.5 °F)-36.8 °C (98.3 °F)] 36.4 °C (97.5 °F)  Pulse:  [78-99] 78  Resp:  [9-20] 16  BP: (105-180)/(50-84) 163/55  SpO2:  [91 %-100 %] 100 %    Physical Exam  Vitals and nursing note reviewed.   HENT:      Head: Normocephalic and atraumatic.   Eyes:      Extraocular Movements: Extraocular movements intact.   Cardiovascular:      Rate and Rhythm: Normal rate.      Heart sounds: Murmur heard.   Pulmonary:      Effort: No respiratory distress.      Breath sounds: Normal breath sounds.   Abdominal:      General: Bowel sounds are normal.      Palpations: Abdomen is soft.      Tenderness: There is no abdominal tenderness.   Musculoskeletal:      Cervical back: Neck supple.      Comments: Decreased  ROM in the left side 2/2 pain    Skin:     General: Skin is warm.   Neurological:      Mental Status: She is alert and oriented to person, place, and time.      Cranial Nerves: No cranial nerve deficit.   Psychiatric:         Mood and Affect: Mood normal.         Fluids    Intake/Output Summary (Last 24 hours) at 6/23/2022 1004  Last data filed at 6/22/2022 1421  Gross per 24 hour   Intake 500 ml   Output 30 ml   Net 470 ml       Laboratory  Recent Labs     06/21/22  1245 06/22/22  0232 06/23/22  0545   WBC 11.1* 9.0 12.0*   RBC 3.82* 2.70* 2.65*   HEMOGLOBIN 12.0 8.3* 8.3*    HEMATOCRIT 35.9* 26.2* 26.3*   MCV 94.0 97.0 99.2*   MCH 31.4 30.7 31.3   MCHC 33.4* 31.7* 31.6*   RDW 53.3* 55.0* 55.0*   PLATELETCT 147* 118* 133*   MPV 11.5 11.9 11.0     Recent Labs     06/21/22  1245 06/22/22  0232 06/23/22  0545   SODIUM 142 139 135   POTASSIUM 3.4* 3.9 5.2   CHLORIDE 108 109 106   CO2 23 21 22   GLUCOSE 121* 126* 128*   BUN 28* 25* 30*   CREATININE 0.60 0.54 0.74   CALCIUM 9.5 8.0* 8.9     Recent Labs     06/21/22  1245   APTT 29.8   INR 1.17*               Imaging  DX-PORTABLE FLUORO > 1 HOUR   Final Result      Portable fluoroscopy utilized for 18 seconds.         INTERPRETING LOCATION: [address] [place] [address], [facility]      DX-FEMUR-2+ LEFT   Final Result      Portable fluoroscopy as described.      DX-CHEST-PORTABLE (1 VIEW)   Final Result      No evidence of acute cardiopulmonary process.      DX-FEMUR-2+ LEFT   Final Result      Acute left intratrochanteric fracture, with slight varus angulation.      EC-ECHOCARDIOGRAM COMPLETE W/O CONT    (Results Pending)        Assessment/Plan  * Intertrochanteric fracture of femur (HCC)- (present on admission)  Assessment & Plan  S/p GLF  Orthopedic  surgery was consulted, patient underwent surgical treatment of the left intertrochanteric femur fracture with intramedullary device by Dr Santiago  on 6/22/2022.   --  patient can bear weight as tolerated. Pending PT /OT, will continue DVT prophylaxis.      Anemia  Assessment & Plan  Likely acute blood loss anemia, hemoglobin at 8.3, monitor, if less than 7, transfuse    Hypokalemia  Assessment & Plan  Resolved  Now at 5.2, monitor   -- renal diet     Acute blood loss anemia  Assessment & Plan  Hemoglobin down from 12 to 8.3  Trend hemoglobin, transfuse if hemoglobin less than 7    Leukocytosis- (present on admission)  Assessment & Plan  At 12s , monitor    Hypertension- (present on admission)  Assessment & Plan  BP stable  Cont norvasc  PRN vasotec and hydralazine    Lightheadedness- (present on  admission)  Assessment & Plan  Obtain orthostatic vital ,  Pending echo     Thrombocytopenia (HCC)- (present on admission)  Assessment & Plan  At 133, monitor, Not actively bleeding        VTE prophylaxis: enoxaparin ppx

## 2022-06-23 NOTE — THERAPY
Occupational Therapy   Initial Evaluation     Patient Name: Leslie Friend  Age:  87 y.o., Sex:  female  Medical Record #: [id number]  Today's Date: 6/23/2022     Precautions  Precautions: Fall Risk, Weight Bearing As Tolerated Left Lower Extremity    Assessment  Patient is 87 y.o. female with a diagnosis of L femur fx s/p IMN.  Additional factors influencing patient status / progress: fatigue, impaired balance, pain.      Plan    Recommend Occupational Therapy 5 times per week until therapy goals are met for the following treatments:  Adaptive Equipment, Neuro Re-Education / Balance, Self Care/Activities of Daily Living, Therapeutic Activities, and Therapeutic Exercises.    DC Equipment Recommendations: Tub Transfer Bench  Discharge Recommendations: Recommend home health for continued occupational therapy services        Objective       06/23/22 1023   Prior Living Situation   Prior Services Home-Independent   Housing / Facility 1 Story House   Bathroom Set up Bathtub / Shower Combination;Shower Curtain   Equipment Owned None   Lives with - Patient's Self Care Capacity Spouse;Adult Children   Comments Pt's dtr will be there to provide 24/7 support.   Prior Level of ADL Function   Self Feeding Independent   Grooming / Hygiene Independent   Bathing Independent   Dressing Independent   Toileting Independent   Prior Level of IADL Function   Prior Level Of Mobility Independent Without Device in Community;Independent Without Device in Home   Occupation (Pre-Hospital Vocational) Retired Due To Age   History of Falls   History of Falls Yes   Precautions   Precautions Fall Risk;Weight Bearing As Tolerated Left Lower Extremity   Pain 0 - 10 Group   Therapist Pain Assessment Nurse Notified;During Activity  (min L hip pain)   Cognition    Cognition / Consciousness WDL   Level of Consciousness Alert   Comments pleasant and cooperative, dtr translating per their request   Balance Assessment   Sitting Balance (Static)  Fair +   Sitting Balance (Dynamic) Fair   Standing Balance (Static) Fair -   Standing Balance (Dynamic) Poor +   Weight Shift Sitting Fair   Weight Shift Standing Fair   Comments w/ fww   Bed Mobility    Supine to Sit Minimal Assist   Scooting Supervised   ADL Assessment   Grooming Supervision;Standing   Lower Body Dressing Maximal Assist   Toileting   (declined need)   How much help from another person does the patient currently need...   Putting on and taking off regular lower body clothing? 2   Bathing (including washing, rinsing, and drying)? 2   Toileting, which includes using a toilet, bedpan, or urinal? 3   Putting on and taking off regular upper body clothing? 3   Taking care of personal grooming such as brushing teeth? 3   Eating meals? 3   6 Clicks Daily Activity Score 16   Functional Mobility   Sit to Stand Contact Guard Assist   Bed, Chair, Wheelchair Transfer Contact Guard Assist   Mobility EOB>Sink>Odell>Chair   Comments w/ FWW   Patient / Family Goals   Patient / Family Goal #1 to go home   Short Term Goals   Short Term Goal # 1 pt will demo toilet txf with supv   Short Term Goal # 2 pt will dress LB with supv and AE prn   Short Term Goal # 3 pt will demo toileting with supv   Short Term Goal # 4 pt will demo simulated tub txf w/ TTB w/ supv

## 2022-06-23 NOTE — DISCHARGE PLANNING
Received Choice form at 1130  Agency/Facility Name: [facility]  Referral sent per Choice form @ [phone]    Pending Order

## 2022-06-23 NOTE — DISCHARGE PLANNING
Care Transition Team Assessment    Information Source  Orientation Level: Oriented X4    Elopement Risk  Legal Hold: No  Ambulatory or Self Mobile in Wheelchair: No-Not an Elopement Risk  Elopement Risk: Not at Risk for Elopement    Interdisciplinary Discharge Planning  Lives with Spouse, daughter and her   Housing / Facility: 1 Story House, 1 steps outside.   Able to Return to Previous ADL's: Future Time w/Therapy  Mobility Issues: No  Prior Services: Home-Independent  Patient Prefers to be Discharged to:: Home with HH.   Assistance Needed: Yes  Durable Medical Equipment: None but needs FWW    Discharge Preparedness  What is your plan after discharge?: Home with HH PT.   What are your discharge supports?: Daughter provides ATC care.   Prior Functional Level: Independent with Activities of Daily Living  Difficulity with ADLs: None  Difficulity with IADLs: Laundry, Cooking, Driving  Difficulity with IADL Comments: Dtr helps    Functional Assesment  Prior Functional Level: Independent with Activities of Daily Living  Domestic Abuse  Have you ever been the victim of abuse or violence?: No    Discharge Risks or Barriers  Discharge risks or barriers?: No    Anticipated Discharge Information  Discharge Disposition: D/T to home under HHA care in anticipation of covered skilled care (06)  Discharge Address: [address] [place] [address]

## 2022-06-23 NOTE — DISCHARGE PLANNING
Case Management Discharge Planning    Admission Date: 6/21/2022  GMLOS: 4.3  ALOS: 2    6-Clicks ADL Score: 16  6-Clicks Mobility Score: 18  PT and/or OT Eval ordered: Yes  Post-acute Referrals Ordered: No  Post-acute Choice Obtained: No  Has referral(s) been sent to post-acute provider:  No      Anticipated Discharge Dispo: Discharge Disposition: D/T to home under HHA care in anticipation of covered skilled care (06)  Discharge Address: [address] [place] [address] [address] [place] [place] [address]    DME Needed: Yes    DME Ordered: Yes, FWW.     Action(s) Taken: Met with patient and her daughter at bedside, confirmed that patient does not have PCP so unable to order HH prior to DC home tomorrow. CM will contact ER scheduling to set up PCP appt with patient.     Escalations Completed: Provider    Medically Clear: No    Next Steps: PCP patient appt for HH referral, FWW delivery to room.     Barriers to Discharge: Medical clearance    Is the patient up for discharge tomorrow: Yes    Is transport arranged for discharge disposition: Yes

## 2022-06-24 ENCOUNTER — APPOINTMENT (OUTPATIENT)
Dept: RADIOLOGY | Facility: MEDICAL CENTER | Age: 87
DRG: 481 | End: 2022-06-24
Attending: HOSPITALIST
Payer: MEDICARE

## 2022-06-24 ENCOUNTER — APPOINTMENT (OUTPATIENT)
Dept: CARDIOLOGY | Facility: MEDICAL CENTER | Age: 87
DRG: 481 | End: 2022-06-24
Attending: STUDENT IN AN ORGANIZED HEALTH CARE EDUCATION/TRAINING PROGRAM
Payer: MEDICARE

## 2022-06-24 PROBLEM — R79.89 ELEVATED TROPONIN: Status: ACTIVE | Noted: 2022-06-24

## 2022-06-24 LAB
ALBUMIN SERPL BCP-MCNC: 2.9 G/DL (ref 3.2–4.9)
BUN SERPL-MCNC: 25 MG/DL (ref 8–22)
CALCIUM SERPL-MCNC: 8.5 MG/DL (ref 8.5–10.5)
CHLORIDE SERPL-SCNC: 110 MMOL/L (ref 96–112)
CO2 SERPL-SCNC: 24 MMOL/L (ref 20–33)
CREAT SERPL-MCNC: 0.5 MG/DL (ref 0.5–1.4)
ERYTHROCYTE [DISTWIDTH] IN BLOOD BY AUTOMATED COUNT: 57.2 FL (ref 35.9–50)
GFR SERPLBLD CREATININE-BSD FMLA CKD-EPI: 91 ML/MIN/1.73 M 2
GLUCOSE SERPL-MCNC: 97 MG/DL (ref 65–99)
HCT VFR BLD AUTO: 22.8 % (ref 37–47)
HGB BLD-MCNC: 7.2 G/DL (ref 12–16)
LV EJECT FRACT  99904: 65
LV EJECT FRACT MOD 2C 99903: 77.16
LV EJECT FRACT MOD 4C 99902: 59.6
LV EJECT FRACT MOD BP 99901: 68.53
MCH RBC QN AUTO: 31.3 PG (ref 27–33)
MCHC RBC AUTO-ENTMCNC: 31.6 G/DL (ref 33.6–35)
MCV RBC AUTO: 99.1 FL (ref 81.4–97.8)
PHOSPHATE SERPL-MCNC: 2.5 MG/DL (ref 2.5–4.5)
PLATELET # BLD AUTO: 139 K/UL (ref 164–446)
PMV BLD AUTO: 11 FL (ref 9–12.9)
POTASSIUM SERPL-SCNC: 5.2 MMOL/L (ref 3.6–5.5)
RBC # BLD AUTO: 2.3 M/UL (ref 4.2–5.4)
SODIUM SERPL-SCNC: 139 MMOL/L (ref 135–145)
T4 FREE SERPL-MCNC: 1.14 NG/DL (ref 0.93–1.7)
TROPONIN T SERPL-MCNC: 31 NG/L (ref 6–19)
TROPONIN T SERPL-MCNC: 52 NG/L (ref 6–19)
VIT B12 SERPL-MCNC: 462 PG/ML (ref 211–911)
VIT B12 SERPL-MCNC: 648 PG/ML (ref 211–911)
WBC # BLD AUTO: 8.6 K/UL (ref 4.8–10.8)

## 2022-06-24 PROCEDURE — 84439 ASSAY OF FREE THYROXINE: CPT

## 2022-06-24 PROCEDURE — A9270 NON-COVERED ITEM OR SERVICE: HCPCS | Performed by: STUDENT IN AN ORGANIZED HEALTH CARE EDUCATION/TRAINING PROGRAM

## 2022-06-24 PROCEDURE — 700102 HCHG RX REV CODE 250 W/ 637 OVERRIDE(OP): Performed by: HOSPITALIST

## 2022-06-24 PROCEDURE — 84484 ASSAY OF TROPONIN QUANT: CPT

## 2022-06-24 PROCEDURE — 93306 TTE W/DOPPLER COMPLETE: CPT | Mod: 26 | Performed by: INTERNAL MEDICINE

## 2022-06-24 PROCEDURE — P9016 RBC LEUKOCYTES REDUCED: HCPCS

## 2022-06-24 PROCEDURE — 700102 HCHG RX REV CODE 250 W/ 637 OVERRIDE(OP): Performed by: ORTHOPAEDIC SURGERY

## 2022-06-24 PROCEDURE — 700111 HCHG RX REV CODE 636 W/ 250 OVERRIDE (IP): Performed by: ORTHOPAEDIC SURGERY

## 2022-06-24 PROCEDURE — 93306 TTE W/DOPPLER COMPLETE: CPT

## 2022-06-24 PROCEDURE — 700102 HCHG RX REV CODE 250 W/ 637 OVERRIDE(OP): Performed by: STUDENT IN AN ORGANIZED HEALTH CARE EDUCATION/TRAINING PROGRAM

## 2022-06-24 PROCEDURE — 86923 COMPATIBILITY TEST ELECTRIC: CPT

## 2022-06-24 PROCEDURE — A9270 NON-COVERED ITEM OR SERVICE: HCPCS | Performed by: HOSPITALIST

## 2022-06-24 PROCEDURE — 36415 COLL VENOUS BLD VENIPUNCTURE: CPT

## 2022-06-24 PROCEDURE — 36430 TRANSFUSION BLD/BLD COMPNT: CPT

## 2022-06-24 PROCEDURE — 80069 RENAL FUNCTION PANEL: CPT

## 2022-06-24 PROCEDURE — 30233N1 TRANSFUSION OF NONAUTOLOGOUS RED BLOOD CELLS INTO PERIPHERAL VEIN, PERCUTANEOUS APPROACH: ICD-10-PCS | Performed by: HOSPITALIST

## 2022-06-24 PROCEDURE — 99233 SBSQ HOSP IP/OBS HIGH 50: CPT | Performed by: HOSPITALIST

## 2022-06-24 PROCEDURE — A9270 NON-COVERED ITEM OR SERVICE: HCPCS | Performed by: ORTHOPAEDIC SURGERY

## 2022-06-24 PROCEDURE — 97116 GAIT TRAINING THERAPY: CPT | Mod: CQ

## 2022-06-24 PROCEDURE — 700105 HCHG RX REV CODE 258: Performed by: HOSPITALIST

## 2022-06-24 PROCEDURE — 85027 COMPLETE CBC AUTOMATED: CPT

## 2022-06-24 PROCEDURE — 700111 HCHG RX REV CODE 636 W/ 250 OVERRIDE (IP): Performed by: HOSPITALIST

## 2022-06-24 PROCEDURE — 82607 VITAMIN B-12: CPT | Mod: 91

## 2022-06-24 PROCEDURE — 770001 HCHG ROOM/CARE - MED/SURG/GYN PRIV*

## 2022-06-24 RX ORDER — OMEPRAZOLE 20 MG/1
20 CAPSULE, DELAYED RELEASE ORAL DAILY
Status: DISCONTINUED | OUTPATIENT
Start: 2022-06-24 | End: 2022-06-25 | Stop reason: HOSPADM

## 2022-06-24 RX ORDER — METOPROLOL SUCCINATE 25 MG/1
12.5 TABLET, EXTENDED RELEASE ORAL
Status: DISCONTINUED | OUTPATIENT
Start: 2022-06-24 | End: 2022-06-25 | Stop reason: HOSPADM

## 2022-06-24 RX ADMIN — METOPROLOL SUCCINATE 12.5 MG: 25 TABLET, EXTENDED RELEASE ORAL at 13:28

## 2022-06-24 RX ADMIN — ENOXAPARIN SODIUM 40 MG: 40 INJECTION SUBCUTANEOUS at 03:00

## 2022-06-24 RX ADMIN — ACETAMINOPHEN 650 MG: 325 TABLET, FILM COATED ORAL at 17:05

## 2022-06-24 RX ADMIN — ACETAMINOPHEN 650 MG: 325 TABLET, FILM COATED ORAL at 13:00

## 2022-06-24 RX ADMIN — OMEPRAZOLE 20 MG: 20 CAPSULE, DELAYED RELEASE ORAL at 10:26

## 2022-06-24 RX ADMIN — ACETAMINOPHEN 650 MG: 325 TABLET, FILM COATED ORAL at 04:27

## 2022-06-24 RX ADMIN — AMLODIPINE BESYLATE 5 MG: 5 TABLET ORAL at 04:23

## 2022-06-24 RX ADMIN — SODIUM CHLORIDE 125 MG: 9 INJECTION, SOLUTION INTRAVENOUS at 17:09

## 2022-06-24 ASSESSMENT — ENCOUNTER SYMPTOMS
DIARRHEA: 0
DEPRESSION: 0
SPUTUM PRODUCTION: 0
VOMITING: 0
NAUSEA: 0
DIZZINESS: 1
ABDOMINAL PAIN: 0
SORE THROAT: 0
BLURRED VISION: 0
CLAUDICATION: 0
HEARTBURN: 0
PALPITATIONS: 0
DOUBLE VISION: 0
MYALGIAS: 0
NERVOUS/ANXIOUS: 1

## 2022-06-24 ASSESSMENT — COGNITIVE AND FUNCTIONAL STATUS - GENERAL
CLIMB 3 TO 5 STEPS WITH RAILING: A LOT
TURNING FROM BACK TO SIDE WHILE IN FLAT BAD: A LITTLE
MOVING TO AND FROM BED TO CHAIR: A LITTLE
STANDING UP FROM CHAIR USING ARMS: A LITTLE
MOBILITY SCORE: 17
MOVING FROM LYING ON BACK TO SITTING ON SIDE OF FLAT BED: A LITTLE
SUGGESTED CMS G CODE MODIFIER MOBILITY: CK
WALKING IN HOSPITAL ROOM: A LITTLE

## 2022-06-24 ASSESSMENT — GAIT ASSESSMENTS
GAIT LEVEL OF ASSIST: CONTACT GUARD ASSIST
DEVIATION: ANTALGIC;BRADYKINETIC
ASSISTIVE DEVICE: FRONT WHEEL WALKER
DISTANCE (FEET): 50

## 2022-06-24 ASSESSMENT — PAIN DESCRIPTION - PAIN TYPE
TYPE: SURGICAL PAIN;ACUTE PAIN
TYPE: SURGICAL PAIN

## 2022-06-24 NOTE — CARE PLAN
The patient is Stable - Low risk of patient condition declining or worsening    Shift Goals  Clinical Goals: pain control  Patient Goals: sleep and comfort  Family Goals: not present  Problem: Pain - Standard  Goal: Alleviation of pain or a reduction in pain to the patient’s comfort goal  Outcome: Progressing   Available pain medications reviewed with patient. Pain managed by PRN and scheduled medications. Encouraged to call if pain is no longer managed.      Problem: Fall Risk  Goal: Patient will remain free from falls  Outcome: Progressing   Fall precautions in place. Patient rounded on hourly. Clutter-free environment maintained. Bed alarm on, bed locked and in lowest position. Call light in reach.

## 2022-06-24 NOTE — PROGRESS NOTES
[facility] Medicine Daily Progress Note    Date of Service  6/24/2022    Chief Complaint  Leslie Friend is a 87 y.o. female admitted 6/21/2022 with   Chief Complaint   Patient presents with   • Sent from Urgent Care     Pt had a GLF on 6/19. Pt was unable to walk afterwards, pt with extreme pain to the left leg. - loc, pt takes daily aspirin, - head strike. Today pt presents to urgent care and told pt's daughter she has a broken hip and needed to come to ER for evaluation         Hospital Course      This is a 87-year-old female with a past medical significant for hypertension presented to ER on 6/21/122 after a ground-level fall resulting in the right-sided hip pain.  Imaging showed acute left intratrochanteric femoral fracture, surgery was consulted, patient underwent surgical treatment of the left intertrochanteric femur fracture with intramedullary device by Dr Santiago  on 6/22/2022.    Orthopedic surgery, patient can bear weight as tolerated. Pending PT /OT, will continue DVT prophylaxis.    Interval events:  -- No acute events overnight, medicine has been stable, heart rate 78-94, blood pressure elevated 145/86, saturating 100% on 1 L of oxygen  --Noted to have elevated WBC to 12, will obtain procalcitonin, hemoglobin at 8.3, monitor, if less than 7, transfuse.  Patient is noted to have macrocytosis with MCV of 98.2, will obtain vitamin B12, TSH  Platelet 133, no WBC, continue to monitor.  --, Will obtain PT/OT orthopedic surgery following    I have discussed this patient's plan of care and discharge plan at IDT rounds today with Case Management, Nursing, Nursing leadership, and other members of the IDT team.    6/24:  -- No acute events overnight, patient noted to be tachycardic with heart rate of 106, blood pressure elevated 150/80, saturating 91% on room air.  Patient hemoglobin noted to be 7.2, patient asymptomatic thus was provided 1 unit of PRBC transfusion.  -- Noted to have iron deficiency  anemia, will provide IV iron.  Patient was recommended to follow-up with primary care physician in as an outpatient who will be established  th wk.  -- PT/OT has evaluate the patient, recommended home health home is currently provided as patient does not have primary care physician  Patient is noted to have need to follow-up with DVT secondary to find an outpatient.    She noted to have elevated troponin of 52, will repeat another troponin patient denied any chest pain, echocardiogram ordered and pending     Consultants/Specialty  orthopedics    Code Status  DNAR, I OK    Disposition  Patient is not medically cleared for discharge.   Anticipate discharge to to skilled nursing facility.  I have placed the appropriate orders for post-discharge needs.    Review of Systems  Review of Systems   Constitutional: Negative for malaise/fatigue.   HENT: Negative for congestion and sore throat.    Eyes: Negative for blurred vision and double vision.   Respiratory: Negative for sputum production.    Cardiovascular: Negative for chest pain, palpitations and claudication.   Gastrointestinal: Negative for abdominal pain, diarrhea, heartburn, nausea and vomiting.   Genitourinary: Negative for dysuria.   Musculoskeletal: Negative for myalgias.   Neurological: Positive for dizziness.   Psychiatric/Behavioral: Negative for depression. The patient is nervous/anxious.    All other systems reviewed and are negative.       Physical Exam  Temp:  [36.5 °C (97.7 °F)-37.7 °C (99.8 °F)] 36.9 °C (98.4 °F)  Pulse:  [] 106  Resp:  [16-18] 16  BP: (110-153)/(47-66) 150/50  SpO2:  [90 %-96 %] 91 %    Physical Exam  Vitals and nursing note reviewed.   HENT:      Head: Normocephalic and atraumatic.   Eyes:      Extraocular Movements: Extraocular movements intact.   Cardiovascular:      Rate and Rhythm: Normal rate.      Heart sounds: Murmur heard.   Pulmonary:      Effort: No respiratory distress.      Breath sounds: Normal breath sounds.    Abdominal:      General: Bowel sounds are normal.      Palpations: Abdomen is soft.      Tenderness: There is no abdominal tenderness.   Musculoskeletal:      Cervical back: Neck supple.      Comments: Decreased  ROM in the left side 2/2 pain    Skin:     General: Skin is warm.   Neurological:      Mental Status: She is alert and oriented to person, place, and time.      Cranial Nerves: No cranial nerve deficit.   Psychiatric:         Mood and Affect: Mood normal.         Fluids    Intake/Output Summary (Last 24 hours) at 6/24/2022 1147  Last data filed at 6/23/2022 2100  Gross per 24 hour   Intake 60 ml   Output 0 ml   Net 60 ml       Laboratory  Recent Labs     06/22/22  0232 06/23/22  0545 06/24/22  0621   WBC 9.0 12.0* 8.6   RBC 2.70* 2.65* 2.30*   HEMOGLOBIN 8.3* 8.3* 7.2*   HEMATOCRIT 26.2* 26.3* 22.8*   MCV 97.0 99.2* 99.1*   MCH 30.7 31.3 31.3   MCHC 31.7* 31.6* 31.6*   RDW 55.0* 55.0* 57.2*   PLATELETCT 118* 133* 139*   MPV 11.9 11.0 11.0     Recent Labs     06/22/22  0232 06/23/22  0545 06/24/22  0621   SODIUM 139 135 139   POTASSIUM 3.9 5.2 5.2   CHLORIDE 109 106 110   CO2 21 22 24   GLUCOSE 126* 128* 97   BUN 25* 30* 25*   CREATININE 0.54 0.74 0.50   CALCIUM 8.0* 8.9 8.5     Recent Labs     06/21/22  1245   APTT 29.8   INR 1.17*               Imaging  CT-HEAD W/O   Final Result      1. No CT evidence of acute infarct, hemorrhage or mass.   2. Mild to moderate global parenchymal atrophy. Chronic small vessel ischemic changes.      DX-PORTABLE FLUORO > 1 HOUR   Final Result      Portable fluoroscopy utilized for 18 seconds.         INTERPRETING LOCATION: [address] [place] [address], [id number]      DX-FEMUR-2+ LEFT   Final Result      Portable fluoroscopy as described.      DX-CHEST-PORTABLE (1 VIEW)   Final Result      No evidence of acute cardiopulmonary process.      DX-FEMUR-2+ LEFT   Final Result      Acute left intratrochanteric fracture, with slight varus angulation.      EC-ECHOCARDIOGRAM COMPLETE W/O CONT     (Results Pending)   IR-US GUIDED PIV    (Results Pending)        Assessment/Plan  * Intertrochanteric fracture of femur (HCC)- (present on admission)  Assessment & Plan  S/p GLF  Orthopedic  surgery was consulted, patient underwent surgical treatment of the left intertrochanteric femur fracture with intramedullary device by Dr Santiago  on 6/22/2022.   --  patient can bear weight as tolerated.  PT /OT recs home health  dvt ppx    Elevated troponin  Assessment & Plan  Elevated trop  --Patient denies any chest pain, shortness of breath associated with this.  Will obtain echocardiogram.  EKG did not show any ST and ST wave changes c/w ischemia  --  Trend trop  - start bb      Anemia  Assessment & Plan  Likely acute blood loss anemia, hemoglobin at 8.3, monitor, if less than 7, transfuse    Hypokalemia  Assessment & Plan  Resolved  Now at 5.2, monitor   -- renal diet     Acute blood loss anemia  Assessment & Plan  Hemoglobin down from 12 to 7.2  Considering patient being symptomatic, she received IV iron along with PRBC transfusion.      Leukocytosis- (present on admission)  Assessment & Plan  At 12s , monitor    Hypertension- (present on admission)  Assessment & Plan  BP stable  Cont norvasc  PRN vasotec and hydralazine    Lightheadedness- (present on admission)  Assessment & Plan  Resolved  Obtain orthostatic vital  ,  Pending echo     Thrombocytopenia (HCC)- (present on admission)  Assessment & Plan  At 139, monitor, Not actively bleeding     Malnutrition (HCC)- (present on admission)  Assessment & Plan  Moderate to severe   Boost tid   Dietray consult       VTE prophylaxis: enoxaparin ppx     I have performed a physical exam and reviewed and updated ROS and Plan today (6/24/2022). In review of yesterday's note (6/23/2022), there are no changes except as documented above.

## 2022-06-24 NOTE — THERAPY
Physical Therapy   Daily Treatment     Patient Name: Leslie Friend  Age:  87 y.o., Sex:  female  Medical Record #: [id number]  Today's Date: 6/24/2022     Precautions  Precautions: Fall Risk;Weight Bearing As Tolerated Left Lower Extremity    Assessment    Pt was pleasant daughter present to translate and plans on assisting at all times at home. She was able to complete bed mobility with Fernando an dextra time. STS with CGA and more assist for sequencing with fww when transferring and turning toward bed. She was able to ambulate 50ft but required more cues for fww management today. She had tendency to keep fww too far forward increasing her risk for falling. She presents with short step length, step to gait pattern and antalgic gait pattern. Encouraged to continue to mobilize and will follow while in house. Answered any questions or concerns daughter had regarding discharge at this time.     Plan    Continue current treatment plan.    DC Equipment Recommendations: Front-Wheel Walker  Discharge Recommendations: Recommend home health for continued physical therapy services         06/24/22 0822   Other Treatments   Other Treatments Provided discussed with daughter regarding safety and home set up. removing rugs, cords and safety with all mobility at home.   Balance   Sitting Balance (Static) Fair +   Sitting Balance (Dynamic) Fair   Standing Balance (Static) Fair -   Standing Balance (Dynamic) Fair -   Weight Shift Sitting Good   Weight Shift Standing Fair   Gait Analysis   Gait Level Of Assist Contact Guard Assist   Assistive Device Front Wheel Walker   Distance (Feet) 50   # of Times Distance was Traveled 1   Deviation Antalgic;Bradykinetic   Skilled Intervention Verbal Cuing;Compensatory Strategies   Comments CGA but required max vc to keep fww closer to reduce risk of falling. Short shuffling steps with step to and antalgic gait pattern.   Bed Mobility    Supine to Sit Minimal Assist   Sit to Supine  Minimal Assist   Scooting Supervised   Skilled Intervention Verbal Cuing   Comments hob elevated, extra time to complete, daughhter does assist   Functional Mobility   Sit to Stand Contact Guard Assist   Bed, Chair, Wheelchair Transfer Minimal Assist   Skilled Intervention Verbal Cuing;Sequencing   Comments more assist for balance and sequencing due to fatigue when returning back to bed   Short Term Goals    Short Term Goal # 1 Pt will perform transfers with FWW and supervision to progress mobility in 6 visits.   Goal Outcome # 1 goal not met   Short Term Goal # 2 Pt will ambulate 100ft with FWW and supervision to progress mobility in 6 visits.   Goal Outcome # 2 Goal not met   Short Term Goal # 3 Pt will ascend 1 step with min A to progress mobility in 6 visits.   Goal Outcome # 3 Goal not met

## 2022-06-24 NOTE — DIETARY
Nutrition Services:  Consult received for failure to thrive. Please see full RD assessment completed 6/23 for failure to thrive. Diet was liberalized to Regular, Low K+. K+ stable today at 5.2. Boost Glucose Control supplements added with meals. Recorded PO intake 6/23 improved with pt eating 50-75% lunch and % dinner. RD will continue to follow.

## 2022-06-24 NOTE — FACE TO FACE
Face to Face Supporting Documentation - Home Health    The encounter with this patient was in whole or in part the primary reason for home health admission.    Date of encounter:   Patient:                    MRN:                       YOB: 2022  Leslie Friend  [id number]  1935     Home health to see patient for:  Skilled Nursing care for assessment, interventions & education, Physical Therapy evaluation and treatment and Occupational therapy evaluation and treatment    Skilled need for:  Medication Management medication management    Skilled nursing interventions to include:  Comment: medication management    Homebound status evidenced by:  Need the aid of supportive devices such as crutches, canes, wheelchairs or walkers. Leaving home requires a considerable and taxing effort. There is a normal inability to leave the home.    Community Physician to provide follow up care: Pcp Pt States None     Optional Interventions? No      I certify the face to face encounter for this home health care referral meets the CMS requirements and the encounter/clinical assessment with the patient was, in whole, or in part, for the medical condition(s) listed above, which is the primary reason for home health care. Based on my clinical findings: the service(s) are medically necessary, support the need for home health care, and the homebound criteria are met.  I certify that this patient has had a face to face encounter by myself.  Odalis Dickey M.D. - NPI: [id number]

## 2022-06-24 NOTE — DISCHARGE PLANNING
Case Management Discharge Planning    Admission Date: 6/21/2022  GMLOS: 4.3  ALOS: 3    6-Clicks ADL Score: 16  6-Clicks Mobility Score: 18  PT and/or OT Eval ordered: Yes  Post-acute Referrals Ordered: Yes  Post-acute Choice Obtained: No  Has referral(s) been sent to post-acute provider:  No      Anticipated Discharge Dispo: Discharge Disposition: D/T to home under HHA care in anticipation of covered skilled care (06)  Discharge Address: [address] [place] [address] [address] [place] [place] [address]    DME Needed: Yes    DME Ordered: Yes, FWW delivered to room.     Action(s) Taken: Met with patient and her daughter at bedside, discussed plan for discharge home tomorrow with [facility]. Patient has PCP appt with MD Weber on Monday 6/27 at 10.45 am and will have HH order sent to [facility] after that visit. Jose at [facility] and will follow up with daughter after MD visit on Monday to set up time of home health visits. FWW delivered to room, Ecuadorean IMM given.      Escalations Completed: None    Medically Clear: No    Next Steps: Medical clearance.     Barriers to Discharge: None    Is the patient up for discharge tomorrow: Yes    Is transport arranged for discharge disposition: Yes

## 2022-06-24 NOTE — CARE PLAN
The patient is stable    Shift Goals  Clinical Goals: pain mgmt  Patient Goals: rest  Family Goals: not present    Progress made toward(s) clinical / shift goals:     Problem: Pain - Standard  Goal: Alleviation of pain or a reduction in pain to the patient’s comfort goal  Outcome: Progressing     Problem: Fall Risk  Goal: Patient will remain free from falls  Outcome: Progressing

## 2022-06-24 NOTE — ASSESSMENT & PLAN NOTE
Elevated trop  --Patient denies any chest pain, shortness of breath associated with this.  Will obtain echocardiogram.  EKG did not show any ST and ST wave changes c/w ischemia  --  Trend trop  - start bb

## 2022-06-24 NOTE — DISCHARGE PLANNING
Agency/Facility Name: [facility]  Spoke To: Leonora    Outcome: Pt is on a PCP hold, can see pt after she meets with her PCP on the 27th. Pt MUST make this appointment in order to begin HH.

## 2022-06-24 NOTE — DISCHARGE PLANNING
[facility] Transitional Care Coordination    Physiatry Dr. Tobias recommending appropriate for IRF level care. Call out to daughter Jaimie ti discuss IRF referral.  Jaimie requested tCC contact her daughter Rebecca.  Call out to Rebecca.  Reviewed specifics of inpatient rehab, answered questions/concerns.  Rebecca stated family currently pursuing home with home health services, however, would discuss inpatient rehab option with family.  Provided TCC contact information. Will follow for choice.     1204- Received voice message from granddaughter Rebecca reporting family declines IPR, prefers discharge home with home health services.  Rehab will no longer follow.

## 2022-06-24 NOTE — PROGRESS NOTES
Report received from day shift RN, assumed Care.   Patient is AOx4, responds appropriately. Family at bedside.     Pain controlled at this time.  Patient is tolerating regular diet, denies nausea/vomiting. + flatus  Up x2 assist with FWW with steady gait.    Plan of care discussed, all questions answered.    Educated on use of call light and importance of calling before getting out of bed. Pt verbalizes understanding.    Call light and belongings within reach, treaded slipper socks on, bed alarm in use, bed in lowest locked position.  All needs met at this time.

## 2022-06-24 NOTE — CARE PLAN
The patient is Watcher - Medium risk of patient condition declining or worsening    Shift Goals  Clinical Goals: Pain mangement, hemodynamic stability, comfort  Patient Goals: rest, get better  Family Goals: Get better and discharge    Problem: Pain - Standard  Goal: Alleviation of pain or a reduction in pain to the patient’s comfort goal  Outcome: Progressing  Note: Educated patient on the use of 1-10 pain scale and use of pain descriptors. Administered pain medication when needed per MAR. Non-pharmacological methods for pain control in place such as rest, repositioning, and enforcing a calm and conductive environment.      Problem: Knowledge Deficit - Standard  Goal: Patient and family/care givers will demonstrate understanding of plan of care, disease process/condition, diagnostic tests and medications  Outcome: Progressing  Note: Patient educated on plan and goals of care and disease process. Education provided on medications, procedures, and equipment. Will continue to re-inforce education when required. All questions and concerns answered at this time.    :

## 2022-06-25 VITALS
OXYGEN SATURATION: 95 % | RESPIRATION RATE: 16 BRPM | DIASTOLIC BLOOD PRESSURE: 59 MMHG | HEART RATE: 86 BPM | HEIGHT: 60 IN | WEIGHT: 85.1 LBS | TEMPERATURE: 98.7 F | SYSTOLIC BLOOD PRESSURE: 131 MMHG | BODY MASS INDEX: 16.71 KG/M2

## 2022-06-25 LAB
ALBUMIN SERPL BCP-MCNC: 3 G/DL (ref 3.2–4.9)
BUN SERPL-MCNC: 15 MG/DL (ref 8–22)
CALCIUM SERPL-MCNC: 8.9 MG/DL (ref 8.5–10.5)
CHLORIDE SERPL-SCNC: 107 MMOL/L (ref 96–112)
CO2 SERPL-SCNC: 24 MMOL/L (ref 20–33)
CREAT SERPL-MCNC: 0.41 MG/DL (ref 0.5–1.4)
ERYTHROCYTE [DISTWIDTH] IN BLOOD BY AUTOMATED COUNT: 57.6 FL (ref 35.9–50)
GFR SERPLBLD CREATININE-BSD FMLA CKD-EPI: 95 ML/MIN/1.73 M 2
GLUCOSE SERPL-MCNC: 101 MG/DL (ref 65–99)
HCT VFR BLD AUTO: 29.7 % (ref 37–47)
HGB BLD-MCNC: 9.8 G/DL (ref 12–16)
MCH RBC QN AUTO: 30.2 PG (ref 27–33)
MCHC RBC AUTO-ENTMCNC: 33 G/DL (ref 33.6–35)
MCV RBC AUTO: 91.4 FL (ref 81.4–97.8)
PHOSPHATE SERPL-MCNC: 2.5 MG/DL (ref 2.5–4.5)
PLATELET # BLD AUTO: 150 K/UL (ref 164–446)
PMV BLD AUTO: 10.5 FL (ref 9–12.9)
POTASSIUM SERPL-SCNC: 3.9 MMOL/L (ref 3.6–5.5)
RBC # BLD AUTO: 3.25 M/UL (ref 4.2–5.4)
SODIUM SERPL-SCNC: 139 MMOL/L (ref 135–145)
WBC # BLD AUTO: 7.4 K/UL (ref 4.8–10.8)

## 2022-06-25 PROCEDURE — 99222 1ST HOSP IP/OBS MODERATE 55: CPT | Performed by: INTERNAL MEDICINE

## 2022-06-25 PROCEDURE — 36415 COLL VENOUS BLD VENIPUNCTURE: CPT

## 2022-06-25 PROCEDURE — 700111 HCHG RX REV CODE 636 W/ 250 OVERRIDE (IP): Performed by: HOSPITALIST

## 2022-06-25 PROCEDURE — 700111 HCHG RX REV CODE 636 W/ 250 OVERRIDE (IP): Performed by: ORTHOPAEDIC SURGERY

## 2022-06-25 PROCEDURE — 85027 COMPLETE CBC AUTOMATED: CPT

## 2022-06-25 PROCEDURE — A9270 NON-COVERED ITEM OR SERVICE: HCPCS | Performed by: ORTHOPAEDIC SURGERY

## 2022-06-25 PROCEDURE — 80069 RENAL FUNCTION PANEL: CPT

## 2022-06-25 PROCEDURE — A9270 NON-COVERED ITEM OR SERVICE: HCPCS | Performed by: HOSPITALIST

## 2022-06-25 PROCEDURE — 700102 HCHG RX REV CODE 250 W/ 637 OVERRIDE(OP): Performed by: HOSPITALIST

## 2022-06-25 PROCEDURE — 99239 HOSP IP/OBS DSCHRG MGMT >30: CPT | Performed by: HOSPITALIST

## 2022-06-25 PROCEDURE — 99024 POSTOP FOLLOW-UP VISIT: CPT | Performed by: ORTHOPAEDIC SURGERY

## 2022-06-25 PROCEDURE — 700102 HCHG RX REV CODE 250 W/ 637 OVERRIDE(OP): Performed by: ORTHOPAEDIC SURGERY

## 2022-06-25 RX ORDER — OXYCODONE HYDROCHLORIDE 5 MG/1
5 TABLET ORAL EVERY 8 HOURS PRN
Qty: 15 TABLET | Refills: 0 | Status: SHIPPED | OUTPATIENT
Start: 2022-06-25 | End: 2022-06-30

## 2022-06-25 RX ORDER — OMEPRAZOLE 20 MG/1
20 CAPSULE, DELAYED RELEASE ORAL DAILY
Qty: 30 CAPSULE | Refills: 0 | Status: SHIPPED | OUTPATIENT
Start: 2022-06-26 | End: 2022-08-02

## 2022-06-25 RX ORDER — AMLODIPINE BESYLATE 5 MG/1
5 TABLET ORAL DAILY
Qty: 30 TABLET | Refills: 1 | Status: SHIPPED | OUTPATIENT
Start: 2022-06-26 | End: 2022-10-19 | Stop reason: SDUPTHER

## 2022-06-25 RX ORDER — METOPROLOL SUCCINATE 25 MG/1
12.5 TABLET, EXTENDED RELEASE ORAL DAILY
Qty: 30 TABLET | Refills: 1 | Status: SHIPPED | OUTPATIENT
Start: 2022-06-26

## 2022-06-25 RX ORDER — AMOXICILLIN 250 MG
2 CAPSULE ORAL 2 TIMES DAILY
Qty: 30 TABLET | Refills: 0 | Status: SHIPPED | OUTPATIENT
Start: 2022-06-25 | End: 2022-08-16

## 2022-06-25 RX ORDER — ASPIRIN 81 MG/1
81 TABLET, CHEWABLE ORAL 2 TIMES DAILY
Qty: 52 TABLET | Refills: 0 | Status: SHIPPED | OUTPATIENT
Start: 2022-06-25 | End: 2022-07-21

## 2022-06-25 RX ORDER — POLYETHYLENE GLYCOL 3350 17 G/17G
17 POWDER, FOR SOLUTION ORAL
Qty: 15 EACH | Refills: 3 | Status: SHIPPED | OUTPATIENT
Start: 2022-06-25 | End: 2022-07-05

## 2022-06-25 RX ADMIN — HYDRALAZINE HYDROCHLORIDE 20 MG: 20 INJECTION INTRAMUSCULAR; INTRAVENOUS at 04:36

## 2022-06-25 RX ADMIN — ACETAMINOPHEN 650 MG: 325 TABLET, FILM COATED ORAL at 01:01

## 2022-06-25 RX ADMIN — ENOXAPARIN SODIUM 40 MG: 40 INJECTION SUBCUTANEOUS at 01:01

## 2022-06-25 RX ADMIN — ACETAMINOPHEN 650 MG: 325 TABLET, FILM COATED ORAL at 11:54

## 2022-06-25 RX ADMIN — OMEPRAZOLE 20 MG: 20 CAPSULE, DELAYED RELEASE ORAL at 05:34

## 2022-06-25 RX ADMIN — ACETAMINOPHEN 650 MG: 325 TABLET, FILM COATED ORAL at 05:34

## 2022-06-25 NOTE — PROGRESS NOTES
Cardiology review note:     I was called by Dr. Dickey regarding this 87-year-old female with a ground-level fall not related to syncope resulting in intertrochanteric left femoral fracture status postsurgical intervention 6/22.  Incidentally noted severe aortic stenosis.    I recommend outpatient evaluation by cardiology in the valve clinic for further evaluation and treatment of her severe aortic stenosis which we will arrange.     At this time it was deemed no formal in person cardiology consultation was necessary, however if this changes due to changes in patient condition or abnormal test results, please consider formal cardiovascular consultation.    Electronically Signed by:    Duane Bass MD, FACC, [facility]  Division of Interventional Cardiology  [facility]    6/25/2022  9:17 AM

## 2022-06-25 NOTE — THERAPY
Missed Therapy     Patient Name: Leslie Friend  Age:  87 y.o., Sex:  female  Medical Record #: [id number]  Today's Date: 6/25/2022    Discussed missed therapy with RN        06/25/22 1524   Treatment Variance   Reason For Missed Therapy Non-Medical - Other (Please Comment)   Interdisciplinary Plan of Care Collaboration   Collaboration Comments OT tx attempted pt actively dc'ing; Family at bedside discussed OT goals expressed no further concerns   Session Information   Date / Session Number  6/25 attempted  (6/23, 1 (1/5, 6/29))

## 2022-06-25 NOTE — DISCHARGE INSTRUCTIONS
Discharge Instructions    Discharged to home by car with relative. Discharged via wheelchair, hospital escort: Yes.  Special equipment needed: Wheelchair    Be sure to schedule a follow-up appointment with your primary care doctor or any specialists as instructed.     Discharge Plan:   Diet Plan: Discussed  Activity Level: Discussed  Confirmed Follow up Appointment: Patient to Call and Schedule Appointment  Confirmed Symptoms Management: Discussed  Medication Reconciliation Updated: Yes    I understand that a diet low in cholesterol, fat, and sodium is recommended for good health. Unless I have been given specific instructions below for another diet, I accept this instruction as my diet prescription.   Other diet: Regular    Special Instructions: Discharge instructions for the Orthopedic Patient    Follow up with Primary Care Physician within 2 weeks of discharge to home, regarding:  Review of medications and diagnostic testing.  Surveillance for medical complications.  Workup and treatment of osteoporosis, if appropriate.     -Is this a Hip/Knee/Shoulder Joint Replacement patient? No    -Is this patient being discharged with medication to prevent blood clots?  Yes, Aspirin   Keep Dressing Clean Dry and intact.   Aspirin, ASA oral tablets  What is this medicine?  ASPIRIN (AS pir in) is a pain reliever. It is used to treat mild pain and fever. This medicine is also used as directed by a doctor to prevent and to treat heart attacks, to prevent strokes and blood clots, and to treat arthritis or inflammation.  This medicine may be used for other purposes; ask your health care provider or pharmacist if you have questions.  COMMON BRAND NAME(S): Aspir-Low, Aspir-Edie, Aspirtab, Anthony Advanced Aspirin, Anthony Aspirin, Anthony Aspirin Extra Strength, Anthony Aspirin Plus, Anthony Extra Strength, Anthony Extra Strength Plus, Anthony Genuine Aspirin, Anthony Womens Aspirin, Bufferin, Bufferin Extra Strength, Bufferin Low Dose  What should  I tell my health care provider before I take this medicine?  They need to know if you have any of these conditions:  anemia  asthma  bleeding problems  child with chickenpox, the flu, or other viral infection  diabetes  gout  if you frequently drink alcohol containing drinks  kidney disease  liver disease  low level of vitamin K  lupus  smoke tobacco  stomach ulcers or other problems  an unusual or allergic reaction to aspirin, tartrazine dye, other medicines, dyes, or preservatives  pregnant or trying to get pregnant  breast-feeding  How should I use this medicine?  Take this medicine by mouth with a glass of water. Follow the directions on the package or prescription label. You can take this medicine with or without food. If it upsets your stomach, take it with food. Do not take your medicine more often than directed.  Talk to your pediatrician regarding the use of this medicine in children. While this drug may be prescribed for children as young as 12 years of age for selected conditions, precautions do apply. Children and teenagers should not use this medicine to treat chicken pox or flu symptoms unless directed by a doctor.  Patients over 65 years old may have a stronger reaction and need a smaller dose.  Overdosage: If you think you have taken too much of this medicine contact a poison control center or emergency room at once.  NOTE: This medicine is only for you. Do not share this medicine with others.  What if I miss a dose?  If you are taking this medicine on a regular schedule and miss a dose, take it as soon as you can. If it is almost time for your next dose, take only that dose. Do not take double or extra doses.  What may interact with this medicine?  Do not take this medicine with any of the following medications:  cidofovir  ketorolac  probenecid  This medicine may also interact with the following medications:  alcohol  alendronate  bismuth subsalicylate  flavocoxid  herbal supplements like feverfew,  garlic, naomy, ginkgo biloba, horse chestnut  medicines for diabetes or glaucoma like acetazolamide, methazolamide  medicines for gout  medicines that treat or prevent blood clots like enoxaparin, heparin, ticlopidine, warfarin  other aspirin and aspirin-like medicines  NSAIDs, medicines for pain and inflammation, like ibuprofen or naproxen  pemetrexed  sulfinpyrazone  varicella live vaccine  This list may not describe all possible interactions. Give your health care provider a list of all the medicines, herbs, non-prescription drugs, or dietary supplements you use. Also tell them if you smoke, drink alcohol, or use illegal drugs. Some items may interact with your medicine.  What should I watch for while using this medicine?  If you are treating yourself for pain, tell your doctor or health care professional if the pain lasts more than 10 days, if it gets worse, or if there is a new or different kind of pain. Tell your doctor if you see redness or swelling. Also, check with your doctor if you have a fever that lasts for more than 3 days. Only take this medicine to prevent heart attacks or blood clotting if prescribed by your doctor or health care professional.  Do not take aspirin or aspirin-like medicines with this medicine. Too much aspirin can be dangerous. Always read the labels carefully.  This medicine can irritate your stomach or cause bleeding problems. Do not smoke cigarettes or drink alcohol while taking this medicine. Do not lie down for 30 minutes after taking this medicine to prevent irritation to your throat.  If you are scheduled for any medical or dental procedure, tell your healthcare provider that you are taking this medicine. You may need to stop taking this medicine before the procedure.  This medicine may be used to treat migraines. If you take migraine medicines for 10 or more days a month, your migraines may get worse. Keep a diary of headache days and medicine use. Contact your healthcare  professional if your migraine attacks occur more frequently.  What side effects may I notice from receiving this medicine?  Side effects that you should report to your doctor or health care professional as soon as possible:  allergic reactions like skin rash, itching or hives, swelling of the face, lips, or tongue  breathing problems  changes in hearing, ringing in the ears  confusion  general ill feeling or flu-like symptoms  pain on swallowing  redness, blistering, peeling or loosening of the skin, including inside the mouth or nose  signs and symptoms of bleeding such as bloody or black, tarry stools; red or dark-brown urine; spitting up blood or brown material that looks like coffee grounds; red spots on the skin; unusual bruising or bleeding from the eye, gums, or nose  trouble passing urine or change in the amount of urine  unusually weak or tired  yellowing of the eyes or skin  Side effects that usually do not require medical attention (report to your doctor or health care professional if they continue or are bothersome):  diarrhea or constipation  headache  nausea, vomiting  stomach gas, heartburn  This list may not describe all possible side effects. Call your doctor for medical advice about side effects. You may report side effects to FDA at 2-825-FDA-8313.  Where should I keep my medicine?  Keep out of the reach of children.  Store at room temperature between 15 and 30 degrees C (59 and 86 degrees F). Protect from heat and moisture. Do not use this medicine if it has a strong vinegar smell. Throw away any unused medicine after the expiration date.  NOTE: This sheet is a summary. It may not cover all possible information. If you have questions about this medicine, talk to your doctor, pharmacist, or health care provider.  © 2020 [employer]vier/Gold Standard (2018-01-30 10:42:13)    Is patient discharged on Warfarin / Coumadin?   No     Depression / Suicide Risk    As you are discharged from this [facility]  facility, it is important to learn how to keep safe from harming yourself.    Recognize the warning signs:  Abrupt changes in personality, positive or negative- including increase in energy   Giving away possessions  Change in eating patterns- significant weight changes-  positive or negative  Change in sleeping patterns- unable to sleep or sleeping all the time   Unwillingness or inability to communicate  Depression  Unusual sadness, discouragement and loneliness  Talk of wanting to die  Neglect of personal appearance   Rebelliousness- reckless behavior  Withdrawal from people/activities they love  Confusion- inability to concentrate     If you or a loved one observes any of these behaviors or has concerns about self-harm, here's what you can do:  Talk about it- your feelings and reasons for harming yourself  Remove any means that you might use to hurt yourself (examples: pills, rope, extension cords, firearm)  Get professional help from the community (Mental Health, Substance Abuse, psychological counseling)  Do not be alone:Call your Safe Contact- someone whom you trust who will be there for you.  Call your local CRISIS HOTLINE 732-9754 or 770-595-6377  Call your local Children's Mobile Crisis Response Team [place] (225) 251-5230 or www.[employer]  Call the toll free National Suicide Prevention Hotlines   National Suicide Prevention Lifeline 367-239-[facility] (4254)  National Hope Line Network 800-SUICIDE (034-3549)    Agency/Facility Name: [facility]  Spoke To: Leonora    Outcome: Pt is on a PCP hold, can see pt after she meets with her PCP on the 27th. Pt MUST make this appointment in order to begin HH

## 2022-06-25 NOTE — CARE PLAN
"The patient is Stable - Low risk of patient condition declining or worsening    Shift Goals  Clinical Goals: pain control, mobility, BP control  Patient Goals: rest, \"go home\"  Family Goals: Get better and discharge    Progress made toward(s) clinical / shift goals:    Problem: Pain - Standard  Goal: Alleviation of pain or a reduction in pain to the patient’s comfort goal  Outcome: Progressing     Problem: Fall Risk  Goal: Patient will remain free from falls  Outcome: Progressing     Problem: Knowledge Deficit - Standard  Goal: Patient and family/care givers will demonstrate understanding of plan of care, disease process/condition, diagnostic tests and medications  Outcome: Progressing     Problem: Skin Integrity  Goal: Skin integrity is maintained or improved  Outcome: Progressing       Patient is not progressing towards the following goals:      "

## 2022-06-25 NOTE — PROGRESS NOTES
Orthopaedic PA Progress Note    Interval changes:Today s my third day of rounding on the patient    ROS - Patient denies any new issues. No chest pain, dyspnea, or fever.  Pain well controlled.    /59   Pulse 86   Temp 37.1 °C (98.7 °F) (Temporal)   Resp 16   Ht 1.524 m (5')   Wt 38.6 kg (85 lb 1.6 oz)   SpO2 95%     Patient seen and examined  No acute distress  Breathing non labored  RRR  Surgical dressing is clean, dry, and intact. Patient clearly fires tibialis anterior, EHL, and gastrocnemius/soleus. Sensation is intact to light touch throughout superficial peroneal, deep peroneal, tibial, saphenous, and sural nerve distributions. Strong and palpable 2+ dorsalis pedis and posterior tibial pulses with capillary refill less than 2 seconds. No lower leg tenderness or discomfort.    Recent Labs     06/23/22  0545 06/24/22  0621 06/25/22  0528   WBC 12.0* 8.6 7.4   RBC 2.65* 2.30* 3.25*   HEMOGLOBIN 8.3* 7.2* 9.8*   HEMATOCRIT 26.3* 22.8* 29.7*   MCV 99.2* 99.1* 91.4   MCH 31.3 31.3 30.2   MCHC 31.6* 31.6* 33.0*   RDW 55.0* 57.2* 57.6*   PLATELETCT 133* 139* 150*   MPV 11.0 11.0 10.5       Active Hospital Problems    Diagnosis    • Lightheadedness [R42]      Priority: High   • Osteoporosis [M81.0]      Priority: Medium   • Malnutrition (HCC) [E46]      Priority: Medium   • Hypertension [I10]      Priority: Medium   • Leukocytosis [D72.829]      Priority: Medium   • Thrombocytopenia (HCC) [D69.6]      Priority: Low   • Elevated troponin [R77.8]    • Hypokalemia [E87.6]    • Anemia [D64.9]    • Acute blood loss anemia [D62]    • Intertrochanteric fracture of femur (HCC) [S72.143A]        Assessment/Plan:  POD#3 S/P IMN L hip  Wt bearing status - AT  PT/OT-initiated  Wound care:dressing left in place  Drains - no  Andrews-no  Sutures/Staples out- 10-14 days post operatively  Antibiotics: complete  DVT Prophylaxis- TEDS/SCDs/Foot pumps.   Lovenox: Start 40mg if not medically contraindicated, Duration-until  ambulatory > 150', ASA 81 PO BID when appropriate for transfer  Future Procedures - none planned  Case Coordination for Discharge Planning - Disposition Clear for disposition (home/SNF/IRF) from Orthopaedic team standpoint.  Follow-Up: needs appointment with Dr. Santiago at [facility] at 10-14 days post-op for re-evaluation, staple removal and radiographs.

## 2022-06-25 NOTE — CONSULTS
Reason of Consult: Severe arctic stenosis    Consulting Physician: Dr. Dickey    HPI:    I was called by Dr. Dickey regarding this 87-year-old female with a ground-level fall not related to syncope resulting in intertrochanteric left femoral fracture status postsurgical intervention 6/22.  Incidentally noted severe aortic stenosis.  Able to relate exertional dyspnea at baseline.  Denies smoking drug use alcohol use or precocious CAD.  Troponins are indeterminate and related to her underlying medical conditions and not consistent with acute coronary syndrome.  Her and her family indicate that she was told she had some sort of heart problem requiring surgery in [place], was planning to have it done until her surgeons were killed on the way to the hospital and she was discharged with no plans for follow-up.  This is per her and her family's recollection       History reviewed. No pertinent past medical history.    Social History     Socioeconomic History   • Marital status:      Spouse name: Not on file   • Number of children: Not on file   • Years of education: Not on file   • Highest education level: Not on file   Occupational History   • Not on file   Tobacco Use   • Smoking status: Never Smoker   • Smokeless tobacco: Never Used   Vaping Use   • Vaping Use: Never used   Substance and Sexual Activity   • Alcohol use: No   • Drug use: No   • Sexual activity: Never   Other Topics Concern   • Not on file   Social History Narrative   • Not on file     Social Determinants of Health     Financial Resource Strain: Not on file   Food Insecurity: Not on file   Transportation Needs: Not on file   Physical Activity: Not on file   Stress: Not on file   Social Connections: Not on file   Intimate Partner Violence: Not on file   Housing Stability: Not on file       No current facility-administered medications on file prior to encounter.     Current Outpatient Medications on File Prior to Encounter   Medication Sig Dispense  Refill   • metoprolol tartrate (LOPRESSOR) 100 MG Tab Take 100 mg by mouth every day.     • Naproxen Sodium (FLANAX PAIN RELIEF PO) Take 550 mg by mouth at bedtime.         Current Facility-Administered Medications   Medication Dose Frequency Provider Last Rate Last Admin   • ferric gluconate complex (FERRLECIT) 125 mg in  mL IVPB  125 mg Q24HR Odalis Dickey M.D.        Followed by   • [START ON 6/26/2022] ferric gluconate complex (FERRLECIT) 250 mg in  mL IVPB  250 mg Q24HR Odalis Dickey M.D.       • omeprazole (PRILOSEC) capsule 20 mg  20 mg DAILY Odalis Dickey M.D.   20 mg at 06/25/22 0534   • metoprolol SR (TOPROL XL) tablet 12.5 mg  12.5 mg Q DAY Odalis Dickey M.D.   12.5 mg at 06/24/22 1328   • enoxaparin (Lovenox) inj 40 mg  40 mg QDAY Ashok Santiago M.D.   40 mg at 06/25/22 0101   • Pharmacy Consult Request ...Pain Management Review 1 Each  1 Each PHARMACY TO DOSE Ashok Santiago M.D.       • acetaminophen (Tylenol) tablet 650 mg  650 mg Q6HRS Ashok Santiago M.D.   650 mg at 06/25/22 0534    Followed by   • [START ON 6/27/2022] acetaminophen (Tylenol) tablet 650 mg  650 mg Q6HRS PRN Ashok Santiago M.D.       • senna-docusate (PERICOLACE or SENOKOT S) 8.6-50 MG per tablet 2 Tablet  2 Tablet BID [place] M.D.   2 Tablet at 06/23/22 1719    And   • polyethylene glycol/lytes (MIRALAX) PACKET 1 Packet  1 Packet QDAY PRN [place] M.D.   1 Packet at 06/23/22 0925    And   • magnesium hydroxide (MILK OF MAGNESIA) suspension 30 mL  30 mL QDAY PRN [place] M.D.   30 mL at 06/23/22 1720    And   • bisacodyl (DULCOLAX) suppository 10 mg  10 mg QDAY PRN [place] M.D.       • ondansetron (ZOFRAN) syringe/vial injection 4 mg  4 mg Q4HRS PRN [place] M.D.   4 mg at 06/22/22 0028   • ondansetron (ZOFRAN ODT) dispertab 4 mg  4 mg Q4HRS PRN [place] M.D.       • HYDROmorphone (Dilaudid) injection 0.5 mg  0.5 mg Q2HRS PRN [place] M.D.   0.5 mg at 06/22/22 0039   •  oxyCODONE immediate-release (ROXICODONE) tablet 5 mg  5 mg Q4HRS PRN [place] M.D.   5 mg at 06/23/22 0620   • hydrALAZINE (APRESOLINE) injection 20 mg  20 mg Q4HRS PRN Kristina Herring M.D.   20 mg at 06/25/22 0436   • amLODIPine (NORVASC) tablet 5 mg  5 mg Q DAY [place] M.D.   5 mg at 06/24/22 0423   • enalaprilat (Vasotec) injection 1.25 mg 1 mL  1.25 mg Q6HRS PRN [place] M.D.       Last reviewed on 6/22/2022 11:10 AM by Toro Phan D.O.      Patient has no known allergies.    History reviewed. No pertinent family history.    ROS: As per HPI all other systems reviewed and negative     Physical Exam   Blood pressure 131/59, pulse 86, temperature 37.1 °C (98.7 °F), temperature source Temporal, resp. rate 16, height 1.524 m (5'), weight 38.6 kg (85 lb 1.6 oz), SpO2 95 %.    Constitutional: Appears well-developed.   HENT: Normocephalic and atraumatic. No scleral icterus.   Neck: No JVD present.   Cardiovascular: Normal rate. Exam reveals no gallop and no friction rub. Murmur heard.   Pulmonary/Chest: CTAB    Abdominal: S/NT/ND BS+   Musculoskeletal:  Pulses present. No atrophy. Strength normal.  Extremities: Exhibits no edema. No clubbing or cyanosis.   Skin: Skin is warm and dry.   Neuro: Non-focal, CN 2-12 intact grossly      Intake/Output Summary (Last 24 hours) at 6/25/2022 0920  Last data filed at 6/24/2022 1415  Gross per 24 hour   Intake 488 ml   Output --   Net 488 ml       Recent Labs     06/23/22  0545 06/24/22  0621 06/25/22  0528   WBC 12.0* 8.6 7.4   RBC 2.65* 2.30* 3.25*   HEMOGLOBIN 8.3* 7.2* 9.8*   HEMATOCRIT 26.3* 22.8* 29.7*   MCV 99.2* 99.1* 91.4   MCH 31.3 31.3 30.2   MCHC 31.6* 31.6* 33.0*   RDW 55.0* 57.2* 57.6*   PLATELETCT 133* 139* 150*   MPV 11.0 11.0 10.5     Recent Labs     06/23/22  0545 06/24/22  0621 06/25/22  0528   SODIUM 135 139 139   POTASSIUM 5.2 5.2 3.9   CHLORIDE 106 110 107   CO2 22 24 24   GLUCOSE 128* 97 101*   BUN 30* 25* 15   CREATININE 0.74 0.50 0.41*    CALCIUM 8.9 8.5 8.9                       ECHO CONCLUSIONS (6/24/2022):  No prior study is available for comparison.   Normal left ventricular systolic function.  The left ventricular ejection fraction is visually estimated to be 65%.  Mean transvalvular gradient is 8 mmHg.   Severe aortic valve stenosis.   Aortic valve area calculated from the continuity equation is 0.48 cm2.  Vmax is 4.18 m/s. Transvalvular gradients are - Peak: 70 mmHg, Mean: 43   mmHg.  Mild tricuspid regurgitation.  Estimated right ventricular systolic pressure is 45 mmHg.      Imaging reviewed    Impressions:  1.  Severe aortic stenosis  2.  Ground-level fall not related to syncope  3.  Left femur fracture status post ORIF  4.  Hypertension    Recommendations:  Recommend continue antihypertensive therapy.  Recommend evaluation for TAVR in the valve clinic.  We will arrange.    Thank you for the consultation. Cardiology will sign off. Please call with any questions.    Duane Bass MD, FACC, [facility]  Division of Interventional Cardiology  [facility]

## 2022-06-25 NOTE — DISCHARGE SUMMARY
Discharge Summary    CHIEF COMPLAINT ON ADMISSION  Chief Complaint   Patient presents with   • Sent from Urgent Care     Pt had a GLF on 6/19. Pt was unable to walk afterwards, pt with extreme pain to the left leg. - loc, pt takes daily aspirin, - head strike. Today pt presents to urgent care and told pt's daughter she has a broken hip and needed to come to ER for evaluation       Reason for Admission  Atypical femoral fracture, unspeci*     Admission Date  6/21/2022    CODE STATUS  DNAR, I OK    HPI & HOSPITAL COURSE      This is a 87-year-old female with a past medical significant for hypertension presented to ER on 6/21/122 after a ground-level fall resulting in the right-sided hip pain.  Imaging showed acute left intratrochanteric femoral fracture, surgery was consulted, patient underwent surgical treatment of the left intertrochanteric femur fracture with intramedullary device by Dr Santiago  on 6/22/2022.    Orthopedic surgery, patient can bear weight as tolerated.  PT/OT has evaluated the patient recommending home health along with front wheel walker.  Unable to bear weight, the patient does not have a primary care physician.  Will provide a walker.  Patient will follow up with orthopedic surgery as an outpatient.    On hospital which was complicated by acute blood loss anemia, patient noted with hemoglobin of 7.2, received 1 unit of PRBC transfusion along with IV iron.  Today her hemoglobin is noted to be at 9.8.  She was noted to have low platelet at 150, monitor, not actively bleeding.      Also echocardiogram was obtained, patient is noted to have severe aortic stenosis, cardiology consulted, and recs following up as an  Op    Patient states that she does live with her daughter and daughter will be able to take care of patient.  She does have a primary care physician appointment on Wednesday which is 6/29/2022    Therefore, she is discharged in fair and stable condition to home with close outpatient  follow-up.    The patient met 2-midnight criteria for an inpatient stay at the time of discharge.    Discharge Date  6/25/2022    FOLLOW UP ITEMS POST DISCHARGE  Please follow up with pcp as an op   Please follow up with Dr Ashok Santiago as an op    DISCHARGE DIAGNOSES  Principal Problem:    Intertrochanteric fracture of femur (HCC) POA: Yes  Active Problems:    Osteoporosis POA: Yes    Malnutrition (HCC) POA: Yes    Thrombocytopenia (HCC) POA: Yes    Lightheadedness POA: Yes    Hypertension POA: Yes    Leukocytosis POA: Yes    Acute blood loss anemia POA: Unknown    Hypokalemia POA: Unknown    Anemia POA: Unknown    Elevated troponin POA: Unknown  Resolved Problems:    * No resolved hospital problems. *      FOLLOW UP  Future Appointments   Date Time Provider Department Center   6/28/2022  1:00 PM TAMERA Ahn     No follow-up provider specified.    MEDICATIONS ON DISCHARGE     Medication List      START taking these medications      Instructions   amLODIPine 5 MG Tabs  Start taking on: June 26, 2022  Commonly known as: NORVASC   Take 1 Tablet by mouth every day.  Dose: 5 mg     aspirin 81 MG Chew chewable tablet  Commonly known as: ASA   Chew 1 Tablet 2 times a day for 26 days.  Dose: 81 mg     metoprolol SR 25 MG Tb24  Start taking on: June 26, 2022  Commonly known as: TOPROL XL   Take 0.5 Tablets by mouth every day.  Dose: 12.5 mg     omeprazole 20 MG delayed-release capsule  Start taking on: June 26, 2022  Commonly known as: PRILOSEC   Take 1 Capsule by mouth every day.  Dose: 20 mg     oxyCODONE immediate-release 5 MG Tabs  Commonly known as: ROXICODONE   Take 1 Tablet by mouth every 8 hours as needed for Severe Pain for up to 5 days.  Dose: 5 mg     polyethylene glycol/lytes 17 g Pack  Commonly known as: MIRALAX   Take 1 Packet by mouth 1 time a day as needed (if sennosides and docusate ineffective after 24 hours).  Dose: 17 g     senna-docusate 8.6-50 MG Tabs  Commonly known as:  PERICOLACE or SENOKOT S   Take 2 Tablets by mouth 2 times a day.  Dose: 2 Tablet        STOP taking these medications    FLANAX PAIN RELIEF PO     Lopressor 100 MG Tabs  Generic drug: metoprolol tartrate            Allergies  No Known Allergies    DIET  Orders Placed This Encounter   Procedures   • Diet Order Diet: Regular; Nutrient modifications: (optional): Low Potassium     Standing Status:   Standing     Number of Occurrences:   1     Order Specific Question:   Diet:     Answer:   Regular [1]     Order Specific Question:   Nutrient modifications: (optional)     Answer:   Low Potassium [11]       ACTIVITY  As tolerated.  Weight bearing as tolerated    CONSULTATIONS  Ortho\cardiology    PROCEDURES  Surgical treatment of left intertrochanteric femur fracture with intramedullary device       LABORATORY  Lab Results   Component Value Date    SODIUM 139 06/25/2022    POTASSIUM 3.9 06/25/2022    CHLORIDE 107 06/25/2022    CO2 24 06/25/2022    GLUCOSE 101 (H) 06/25/2022    BUN 15 06/25/2022    CREATININE 0.41 (L) 06/25/2022        Lab Results   Component Value Date    WBC 7.4 06/25/2022    HEMOGLOBIN 9.8 (L) 06/25/2022    HEMATOCRIT 29.7 (L) 06/25/2022    PLATELETCT 150 (L) 06/25/2022        Total time of the discharge process exceeds 35 minutes.

## 2022-06-25 NOTE — CARE PLAN
"  Problem: Pain - Standard  Goal: Alleviation of pain or a reduction in pain to the patient’s comfort goal  Outcome: Progressing     Problem: Fall Risk  Goal: Patient will remain free from falls  Outcome: Progressing   The patient is Stable - Low risk of patient condition declining or worsening    Shift Goals  Clinical Goals: pain control, mobility, BP control  Patient Goals: rest, \"go home\"  Family Goals: Get better and discharge    Progress made toward(s) clinical / shift goals: pain managed with scheduled medications. Fall precautions in place, pt calls appropriately    Patient is not progressing towards the following goals:      "

## 2022-06-25 NOTE — PROGRESS NOTES
Discussed discharge instructions with family including medication list and f/u apt and how to care for dressing. No questions at the moment. Tolerated IV removal. Patient able to get to wheelchair with FWW and family support. Patient escorted outside in wheelchair with CNA.

## 2022-06-27 ENCOUNTER — TELEPHONE (OUTPATIENT)
Dept: CARDIOLOGY | Facility: MEDICAL CENTER | Age: 87
End: 2022-06-27
Payer: MEDICARE

## 2022-06-27 DIAGNOSIS — Z01.810 PRE-PROCEDURAL CARDIOVASCULAR EXAMINATION: ICD-10-CM

## 2022-06-27 DIAGNOSIS — I35.0 SEVERE AORTIC STENOSIS: ICD-10-CM

## 2022-06-27 NOTE — TELEPHONE ENCOUNTER
Referral from: Dr. Bass in patient for sev AS    Patient called on 6/27/2022 using [employer]  [place], [place] [id number].  GRAY for phone call back.     First attempt

## 2022-06-28 ENCOUNTER — OFFICE VISIT (OUTPATIENT)
Dept: INTERNAL MEDICINE | Facility: OTHER | Age: 87
End: 2022-06-28
Payer: MEDICARE

## 2022-06-28 VITALS
DIASTOLIC BLOOD PRESSURE: 63 MMHG | WEIGHT: 79.4 LBS | HEIGHT: 61 IN | SYSTOLIC BLOOD PRESSURE: 160 MMHG | TEMPERATURE: 98.3 F | OXYGEN SATURATION: 97 % | BODY MASS INDEX: 14.99 KG/M2 | HEART RATE: 80 BPM

## 2022-06-28 DIAGNOSIS — D62 ACUTE BLOOD LOSS ANEMIA: ICD-10-CM

## 2022-06-28 DIAGNOSIS — I35.0 SYMPTOMATIC SEVERE AORTIC STENOSIS WITH NORMAL EJECTION FRACTION: ICD-10-CM

## 2022-06-28 DIAGNOSIS — Z76.89 ENCOUNTER TO ESTABLISH CARE: ICD-10-CM

## 2022-06-28 DIAGNOSIS — S72.142D CLOSED DISPLACED INTERTROCHANTERIC FRACTURE OF LEFT FEMUR WITH ROUTINE HEALING, SUBSEQUENT ENCOUNTER: ICD-10-CM

## 2022-06-28 DIAGNOSIS — I10 HYPERTENSION, UNSPECIFIED TYPE: ICD-10-CM

## 2022-06-28 DIAGNOSIS — E44.1 MILD PROTEIN-CALORIE MALNUTRITION (HCC): ICD-10-CM

## 2022-06-28 PROCEDURE — 99214 OFFICE O/P EST MOD 30 MIN: CPT | Mod: GC | Performed by: STUDENT IN AN ORGANIZED HEALTH CARE EDUCATION/TRAINING PROGRAM

## 2022-06-28 RX ORDER — FERROUS SULFATE 325(65) MG
325 TABLET ORAL
Qty: 90 TABLET | Refills: 2 | Status: SHIPPED | OUTPATIENT
Start: 2022-06-29 | End: 2022-10-19

## 2022-06-28 RX ORDER — ZOLEDRONIC ACID 5 MG/100ML
5 INJECTION, SOLUTION INTRAVENOUS
Qty: 100 ML | Refills: 0 | Status: SHIPPED | OUTPATIENT
Start: 2022-06-28 | End: 2022-07-20

## 2022-06-28 ASSESSMENT — FIBROSIS 4 INDEX: FIB4 SCORE: 3.5

## 2022-06-28 NOTE — PROGRESS NOTES
Leslie Friend is a 87 y.o. female who presents today with the following:    CC: Establish Care with Primary Care Physician Hospital follow up.      services were used in the patient's primary language of Palestinian.     Name or Number: Jyotsna  Mode of interpretation: iPad    Content of Interpretation:  HPI and AP    HPI:  Ms. Edwards is a very pleasant 87-year-old Palestinian-speaking female with a past medical history remarkable for hypertension, and aortic stenosis worked up in [place] never operated on who presents today for hospital follow-up for 619 through 624 hospital stay for right hip fracture status post intramedullary nailing.  Patient states that she fell initially when getting up from sleeping, she did not feel any palpitations, but might of felt some dizziness as she has chronic dizziness orthostatically.  Patient states that she was getting ready to go upstairs as her grandchildren were in town, when she fell got up felt dizzy and fell again.  It was at the time of the second fall she noticed extreme pain from her right leg and was then brought to the hospital where she underwent surgical management, patient was also found to be anemic secondary to blood loss anemia and received 1 unit of packed red blood cells with hemoglobin trending nicely up to 9.4 on discharge, patient was incidentally found to have severe aortic stenosis. Patient otherwise doing well, a\mbulating greater than 50 feet multiple times per day, only taking one oxycodone yesterday, follow up with orthopedics. Patient occasionally eating one   ROS:       General: No fevers, chills, night sweats, weight loss or gain  HEENT: No hearing changes, vision changes, eye pain, ear pain, nasal discharge, sore throat  Neck: No swelling in neck  Pulmonary: No shortness of breath, cough, sputum, or hemoptysis  Cardiovascular: No chest pain, palpitations, or LE swelling  GI: No nausea, vomiting, diarrhea, constipation,  abdominal pain, hematochezia or melena  : No dysuria or frequency  Neuro: No focal weakness, no general weakness, no headaches, no lightheadedness, no dizziness  Psych: No anxiety or depression    No past medical history on file.  Appendix     Past Surgical History:   Procedure Laterality Date   • FEMUR NAILING INTRAMEDULLARY  6/22/2022    Procedure: LEFT FEMUR SHORT NAIL;  Surgeon: Ashok Santiago M.D.;  Location: SURGERY [facility];  Service: Orthopedics       No family history on file.    Social History     Tobacco Use   • Smoking status: Never Smoker   • Smokeless tobacco: Never Used   Vaping Use   • Vaping Use: Never used   Substance Use Topics   • Alcohol use: No   • Drug use: No     Occupation: retired  Social:lives with daughter in [place]   Sexual:not active, postmenopausal  [place]:no  Exposures: no   Safety: feel safe in own home.   Activity:usually can walk greater than 400  Diet: frioles, arroz, kassidy , ensure    Current Outpatient Medications   Medication Sig Dispense Refill   • amLODIPine (NORVASC) 5 MG Tab Take 1 Tablet by mouth every day. 30 Tablet 1   • metoprolol SR (TOPROL XL) 25 MG TABLET SR 24 HR Take 0.5 Tablets by mouth every day. 30 Tablet 1   • omeprazole (PRILOSEC) 20 MG delayed-release capsule Take 1 Capsule by mouth every day. 30 Capsule 0   • oxyCODONE immediate-release (ROXICODONE) 5 MG Tab Take 1 Tablet by mouth every 8 hours as needed for Severe Pain for up to 5 days. 15 Tablet 0   • senna-docusate (PERICOLACE OR SENOKOT S) 8.6-50 MG Tab Take 2 Tablets by mouth 2 times a day. 30 Tablet 0   • aspirin (ASA) 81 MG Chew Tab chewable tablet Chew 1 Tablet 2 times a day for 26 days. 52 Tablet 0   • polyethylene glycol/lytes (MIRALAX) 17 g Pack Take 1 Packet by mouth 1 time a day as needed (if sennosides and docusate ineffective after 24 hours). (Patient not taking: Reported on 6/28/2022) 15 Each 3     No current facility-administered medications for this visit.       Physical Exam:  BP (!)  "160/63 (BP Location: Left arm, Patient Position: Sitting, BP Cuff Size: Adult)   Pulse 80   Temp 36.8 °C (98.3 °F) (Temporal)   Ht 1.549 m (5' 1\")   Wt 36 kg (79 lb 6.4 oz)   SpO2 97%   BMI 15.00 kg/m²   General: frail, older woman, appears stated age, NAD  HEENT: NC/AT, PERRL, EOMI, no scleral icterus or conjunctival pallor, fair dentition, no nasal discharge or oral erythema or exudates.   Neck: Supple, No cervical or supraclavicular LAD  CV:RRR, 4/6 LINDA Best heard RUSB.   Pulm: LCAB, no crackles, rales, rhonchi, or wheezing  GI: Normal bowel sounds, abdomen soft, nontender, nondistended to deep or light palpation in all 4 quadrants, no HSM.  MSK: Radial and dorsalis pedis pulses 2+ and equal bilaterally, respectively.  Strength 5 out of 5 in upper and lower extremities.  No lower extremity edema  Neuro: Patient is alert and oriented x3, no focal deficits  Psych: Appropriate mood and affect          Assessment and Plan:   1. Symptomatic severe aortic stenosis with normal ejection fraction  Patient with severe symptomatic aortic stenosis long history of possible aortic stenosis per patient medical records in [place] currently unavailable has never received medical intervention previously, on echo 6/24 patient with aortic valve area of 0.48 cm² transvalvular gradient peak is 70 mean is 43.  Patient with some orthostatic hypotension at baseline for many years possibly contributing to fall as below. Patient and daughter wishing to pursue intervention from cardiology.   - Pt to follow up with cardiology for possible percutaneous management of AS given age.   -Avoid tight Bp control   -continue metoprol and amlodipine   -Will wait for cardiology when considering statin in geriatric patient with questionable benefit given age.     2. Hypertension, unspecified type  BP in 160s systolic today, on amlodipine and metoprolol, patient preload dependant with aortic stenosis, will not control any tighter   -Goal BP under " 180 systolic.     3. Closed displaced intertrochanteric fracture of left femur with routine healing, subsequent encounter  Patient with healing femur fracture, some pain, not limiting ambulation, has not started PT at this time, pathologic fracture, will require medical management as well.   -Continue to follow with orthopedics  -Counseled on orthostatics   -Counseled on fall risk reduction  - Referral to Physical Therapy  - zoledronic Acid (RECLAST) 5 MG/100ML Solution IVPB premix; Infuse 100 mL into a venous catheter Once Every 12 Months.  Dispense: 100 mL; Refill: 0  - Referral to Home Health    4. Acute blood loss anemia  Pts hbg 12.6 before injury, required 1 Unit PRBC before discharge hbg in 9s, hx of fe def anemia not on iron pills.   -monitor on cbc at future appointments  - ferrous sulfate 325 (65 Fe) MG tablet; Take 1 Tablet by mouth every Monday, Wednesday, and Friday.  Dispense: 90 Tablet; Refill: 2    5. Mild protein-calorie malnutrition (HCC)  Patient is BMI 15 appears frail after fall, eating  diet, not calorie counting, ocassional ensure  -Encourage increased PO intake  -Ensure daily   - Referral to Home Health for calorie count     6. Encounter to establish care  HPI and physical exam, pmh and psh reviewed with patient.       No problem-specific Assessment & Plan notes found for this encounter.       Return in about 5 weeks (around 8/2/2022).    Patient Instructions   Thank you for visiting today!  Please follow-up in 5 weeks  Please follow-up on referrals and schedule an appointment with cardiology and to get your infusion for your bone medication  Please call Physical therapy and home health to help you get strong   Please take several minutes from going from sitting to standing and do not walk if you feel dizzy.   Please start taking an iron pill to help rebuild your blood.   Please drink an ensure shake at least daily.   Please try and eat healthy, get at least 30 minutes of  cardiovascular exercise a day to help keep your health as best as it can be.  If you have any questions or concerns please feel free to contact us at [phone].  If you feel like you are experiencing a medical emergency please seek immediate medical attention at an urgent care or in the emergency department.       June 28, 2022     Leslie Friend  [address] [place] [address]     Dear Leslie Friend,     Your referral has been processed to the specialist below. Please contact their office to schedule your appointment if you do not already have one.     Cardiology     [facility]  [address], [address]  [place] [address]  Phone: [phone]     Sincerely,     Your Healthcare Team.      Marcelino Obrien M.D. PGY I  [facility]    This note was created using voice recognition software.  While every attempt is made to ensure accuracy of transcription, occasionally errors occur.

## 2022-06-28 NOTE — TELEPHONE ENCOUNTER
Called patient using [employer]  Maximino ID# [id number]     GRAY for patient to call back to discuss.    Second attempt

## 2022-06-28 NOTE — PATIENT INSTRUCTIONS
Thank you for visiting today!  Please follow-up in 5 weeks  Please follow-up on referrals and schedule an appointment with cardiology and to get your infusion for your bone medication  Please call Physical therapy and home health to help you get strong   Please take several minutes from going from sitting to standing and do not walk if you feel dizzy.   Please start taking an iron pill to help rebuild your blood.   Please drink an ensure shake at least daily.   Please try and eat healthy, get at least 30 minutes of cardiovascular exercise a day to help keep your health as best as it can be.  If you have any questions or concerns please feel free to contact us at [phone].  If you feel like you are experiencing a medical emergency please seek immediate medical attention at an urgent care or in the emergency department.       June 28, 2022     Leslie Friend  [address] [place] [address]     Dear Leslie Friend,     Your referral has been processed to the specialist below. Please contact their office to schedule your appointment if you do not already have one.     Cardiology     [facility]  [address], [facility] [address]  [place] [address]  Phone: [phone]     Sincerely,     Your Healthcare Team.

## 2022-06-29 ENCOUNTER — TELEPHONE (OUTPATIENT)
Dept: ONCOLOGY | Facility: MEDICAL CENTER | Age: 87
End: 2022-06-29
Payer: MEDICARE

## 2022-06-29 RX ORDER — ZOLEDRONIC ACID 5 MG/100ML
5 INJECTION, SOLUTION INTRAVENOUS ONCE
Status: CANCELLED | OUTPATIENT
Start: 2022-07-06 | End: 2022-07-06

## 2022-06-30 NOTE — TELEPHONE ENCOUNTER
Called patient using [employer]  Andrea ID# [id number].     Spoke to patient's daughter Jaimie who speaks English. She states her mom now has Medicare A/B.     Discussed with Jaimie consultation appointments, testing needed, and plan of care.    Patient given dates and times of testing and consultations.    All questions answered.    Phone number given to patient for [facility] for any further questions or concerns.

## 2022-07-11 NOTE — H&P
REFERRING PHYSICIAN: Clive Saunders MD.     CONSULTING PHYSICIAN: Francine Barahona MD     CHIEF COMPLAINT: recent hip fracture, aortic stenosis noted on echo during hospitalization    HISTORY OF PRESENT ILLNESS: The patient is a 87 y.o. woman with known medical conditions of  HTN, osteoporosis, chronic malnutrition. She had recent hospitalization for hip fracture during which TTE was performed.   Today she states she is fatigued and somnolent.   She denies shortness of breath, chest pain, lower extremity edema, dizziness, syncope, orthopnea, or PND.    She comes in with her daughter; she is in a wheelchair.        PAST MEDICAL HISTORY:   Active Ambulatory Problems     Diagnosis Date Noted   • Intertrochanteric fracture of femur (HCC) 06/21/2022   • Osteoporosis 06/21/2022   • Malnutrition (HCC) 06/21/2022   • Thrombocytopenia (HCC) 06/21/2022   • Lightheadedness 06/21/2022   • Hypertension 06/21/2022   • Leukocytosis 06/21/2022   • Acute blood loss anemia 06/22/2022   • Hypokalemia 06/23/2022   • Anemia 06/23/2022   • Elevated troponin 06/24/2022     Resolved Ambulatory Problems     Diagnosis Date Noted   • No Resolved Ambulatory Problems     No Additional Past Medical History       PAST SURGICAL HISTORY:   Past Surgical History:   Procedure Laterality Date   • FEMUR NAILING INTRAMEDULLARY  6/22/2022    Procedure: LEFT FEMUR SHORT NAIL;  Surgeon: Ashok Santiago M.D.;  Location: SURGERY [facility];  Service: Orthopedics        ALLERGIES: No Known Allergies     CURRENT MEDICATIONS:   Current Outpatient Medications:   •  zoledronic Acid (RECLAST) 5 MG/100ML Solution IVPB premix, Infuse 100 mL into a venous catheter Once Every 12 Months., Disp: 100 mL, Rfl: 0  •  ferrous sulfate 325 (65 Fe) MG tablet, Take 1 Tablet by mouth every Monday, Wednesday, and Friday., Disp: 90 Tablet, Rfl: 2  •  amLODIPine (NORVASC) 5 MG Tab, Take 1 Tablet by mouth every day., Disp: 30 Tablet, Rfl: 1  •  metoprolol SR (TOPROL XL) 25 MG  TABLET SR 24 HR, Take 0.5 Tablets by mouth every day., Disp: 30 Tablet, Rfl: 1  •  omeprazole (PRILOSEC) 20 MG delayed-release capsule, Take 1 Capsule by mouth every day., Disp: 30 Capsule, Rfl: 0  •  senna-docusate (PERICOLACE OR SENOKOT S) 8.6-50 MG Tab, Take 2 Tablets by mouth 2 times a day., Disp: 30 Tablet, Rfl: 0  •  aspirin (ASA) 81 MG Chew Tab chewable tablet, Chew 1 Tablet 2 times a day for 26 days., Disp: 52 Tablet, Rfl: 0    FAMILY HISTORY: No family history on file.     SOCIAL HISTORY:   Social History     Socioeconomic History   • Marital status:      Spouse name: Not on file   • Number of children: Not on file   • Years of education: Not on file   • Highest education level: Not on file   Occupational History   • Not on file   Tobacco Use   • Smoking status: Never Smoker   • Smokeless tobacco: Never Used   Vaping Use   • Vaping Use: Never used   Substance and Sexual Activity   • Alcohol use: No   • Drug use: No   • Sexual activity: Never   Other Topics Concern   • Not on file   Social History Narrative   • Not on file     Social Determinants of Health     Financial Resource Strain: Not on file   Food Insecurity: Not on file   Transportation Needs: Not on file   Physical Activity: Not on file   Stress: Not on file   Social Connections: Not on file   Intimate Partner Violence: Not on file   Housing Stability: Not on file       REVIEW OF SYSTEMS:  Review of Systems   Constitutional: Positive for malaise/fatigue. Negative for chills, fever and weight loss.   HENT: Negative.  Negative for ear pain, nosebleeds and tinnitus.    Eyes: Negative.  Negative for double vision, photophobia and pain.   Respiratory: Negative.  Negative for cough, hemoptysis and shortness of breath.    Cardiovascular: Negative.  Negative for chest pain, palpitations, orthopnea, leg swelling and PND.   Gastrointestinal: Negative.  Negative for abdominal pain, blood in stool, nausea and vomiting.   Genitourinary: Negative.   Negative for frequency, hematuria and urgency.   Musculoskeletal: Positive for joint pain.        Recent femur fx   Skin: Negative for rash.        Skin lesion recent growth   Neurological: Negative.  Negative for dizziness, tremors, speech change, focal weakness, seizures and headaches.   Endo/Heme/Allergies: Negative.  Negative for polydipsia. Does not bruise/bleed easily.   Psychiatric/Behavioral: Negative.  Negative for hallucinations and memory loss.         PHYSICAL EXAMINATION:    Physical Exam  Vitals reviewed.   Constitutional:       General: She is not in acute distress.     Comments: Extremely thin   HENT:      Head: Normocephalic and atraumatic.      Right Ear: External ear normal.      Left Ear: External ear normal.      Nose: Nose normal. No congestion.   Eyes:      General: No scleral icterus.     Extraocular Movements: Extraocular movements intact.      Conjunctiva/sclera: Conjunctivae normal.   Cardiovascular:      Rate and Rhythm: Normal rate and regular rhythm.   Pulmonary:      Effort: Pulmonary effort is normal. No respiratory distress.   Abdominal:      General: Abdomen is flat. There is no distension.   Musculoskeletal:      Cervical back: Normal range of motion.      Right lower leg: No edema.      Left lower leg: No edema.      Comments: In wheelchair with recent hip fx   Skin:     General: Skin is warm and dry.   Neurological:      Mental Status: She is alert and oriented to person, place, and time. Mental status is at baseline.      Cranial Nerves: No cranial nerve deficit.   Psychiatric:         Mood and Affect: Mood normal.         Judgment: Judgment normal.       Vitals:    07/20/22 1428   BP: 136/74   Pulse: 76   Temp: 36.8 °C (98.2 °F)   SpO2: 96%     LABS REVIEWED:  Lab Results   Component Value Date/Time    SODIUM 139 06/25/2022 05:28 AM    POTASSIUM 3.9 06/25/2022 05:28 AM    CHLORIDE 107 06/25/2022 05:28 AM    CO2 24 06/25/2022 05:28 AM    GLUCOSE 101 (H) 06/25/2022 05:28 AM     BUN 15 06/25/2022 05:28 AM    CREATININE 0.41 (L) 06/25/2022 05:28 AM      Lab Results   Component Value Date/Time    PROTHROMBTM 14.5 06/21/2022 12:45 PM    INR 1.17 (H) 06/21/2022 12:45 PM      Lab Results   Component Value Date/Time    WBC 7.4 06/25/2022 05:28 AM    RBC 3.25 (L) 06/25/2022 05:28 AM    HEMOGLOBIN 9.8 (L) 06/25/2022 05:28 AM    HEMATOCRIT 29.7 (L) 06/25/2022 05:28 AM    MCV 91.4 06/25/2022 05:28 AM    MCH 30.2 06/25/2022 05:28 AM    MCHC 33.0 (L) 06/25/2022 05:28 AM    MPV 10.5 06/25/2022 05:28 AM    NEUTSPOLYS 87.60 (H) 06/22/2022 02:32 AM    LYMPHOCYTES 5.00 (L) 06/22/2022 02:32 AM    MONOCYTES 6.40 06/22/2022 02:32 AM    EOSINOPHILS 0.30 06/22/2022 02:32 AM    BASOPHILS 0.30 06/22/2022 02:32 AM        IMAGING REVIEWED AND INTERPRETED:    ECHOCARDIOGRAM [facility] 6/14/2022:   Normal left ventricular systolic function.  The left ventricular ejection fraction is  65%.  Calcification of the mitral valve leaflets. Moderate mitral stenosis.Mean transvalvular gradient is 8 mmHg.   Severe aortic valve stenosis.   Aortic valve area calculated from the continuity equation is 0.48 cm2. Vmax is 4.18 m/s. Transvalvular gradients are - Peak: 70 mmHg, Mean: 43 mmHg.  Mild tricuspid regurgitation.    ANGIOGRAM: pending      IMPRESSION:  88 yo woman with significant fraility and unclear cardiopulmonary symptoms. Multiple chronic medical problems now with recent hip/femur fracture improving her mobility but still very unsteady with cane or walker per her daughter.       PLAN:  I recommend continuing with expectant management and follow up TTE after her convalescence from femur fracture is complete. If she is found to have symptomatic severe aortic stenosis she appears to be a TAVR candidate given extreme fraility, advanced age and patient preference.    The STS mortality risk score is 5% and the morbidity and mortality risk score is 18%. The scores were discussed with patient.    Thank you for this very challenging  consultation and participation in the patient’s care.  I will keep you apprised of all future developments.      Sincerely,     Francine Barahona MD

## 2022-07-19 ENCOUNTER — TELEPHONE (OUTPATIENT)
Dept: CARDIOLOGY | Facility: MEDICAL CENTER | Age: 87
End: 2022-07-19
Payer: MEDICARE

## 2022-07-19 PROBLEM — D64.9 ANEMIA: Status: RESOLVED | Noted: 2022-06-23 | Resolved: 2022-07-19

## 2022-07-19 PROBLEM — R79.89 ELEVATED TROPONIN: Status: RESOLVED | Noted: 2022-06-24 | Resolved: 2022-07-19

## 2022-07-19 PROBLEM — E87.6 HYPOKALEMIA: Status: RESOLVED | Noted: 2022-06-23 | Resolved: 2022-07-19

## 2022-07-19 NOTE — TELEPHONE ENCOUNTER
----- Message from Isabell Ayala R.N. sent at 7/19/2022  9:20 AM PDT -----  Regarding: FW: Pre TAVR cath  Can you please do the paperwork for this patient's cath hold for 7/25? We see her in clinic tomorrow. Thank you!  ----- Message -----  From: Deana Porter, Med Ass't  Sent: 6/30/2022  10:17 AM PDT  To: Isabell Ayala R.N.  Subject: RE: Pre TAVR cath                                I hold has been put on 7-25-22 with TW for this patient.  ----- Message -----  From: Isabell Ayala R.N.  Sent: 6/30/2022   9:44 AM PDT  To: Deana Porter, Med Ass't  Subject: Pre TAVR cath                                    Can you please hold a pre TAVR cath slot for this patient 7/25 or 7/26? Zenaida usually just puts the patient in the slot but waits to actually schedule it until I confirm with her on 7/20. Thanks!!

## 2022-07-20 ENCOUNTER — OFFICE VISIT (OUTPATIENT)
Dept: CARDIOTHORACIC SURGERY | Facility: MEDICAL CENTER | Age: 87
End: 2022-07-20
Payer: MEDICARE

## 2022-07-20 ENCOUNTER — OFFICE VISIT (OUTPATIENT)
Dept: CARDIOLOGY | Facility: MEDICAL CENTER | Age: 87
End: 2022-07-20
Payer: MEDICARE

## 2022-07-20 ENCOUNTER — TELEPHONE (OUTPATIENT)
Dept: CARDIOLOGY | Facility: MEDICAL CENTER | Age: 87
End: 2022-07-20

## 2022-07-20 ENCOUNTER — DOCUMENTATION (OUTPATIENT)
Dept: CARDIOLOGY | Facility: MEDICAL CENTER | Age: 87
End: 2022-07-20

## 2022-07-20 VITALS
SYSTOLIC BLOOD PRESSURE: 136 MMHG | DIASTOLIC BLOOD PRESSURE: 74 MMHG | HEIGHT: 61 IN | OXYGEN SATURATION: 96 % | WEIGHT: 76 LBS | HEART RATE: 76 BPM | BODY MASS INDEX: 14.35 KG/M2 | TEMPERATURE: 98.2 F

## 2022-07-20 VITALS
HEART RATE: 78 BPM | SYSTOLIC BLOOD PRESSURE: 130 MMHG | BODY MASS INDEX: 14.35 KG/M2 | DIASTOLIC BLOOD PRESSURE: 80 MMHG | OXYGEN SATURATION: 98 % | WEIGHT: 76 LBS | RESPIRATION RATE: 16 BRPM | HEIGHT: 61 IN

## 2022-07-20 DIAGNOSIS — I35.0 SEVERE AORTIC STENOSIS: ICD-10-CM

## 2022-07-20 DIAGNOSIS — R54 FRAILTY: ICD-10-CM

## 2022-07-20 DIAGNOSIS — L98.9 SKIN LESION OF BACK: ICD-10-CM

## 2022-07-20 PROCEDURE — 99215 OFFICE O/P EST HI 40 MIN: CPT | Performed by: INTERNAL MEDICINE

## 2022-07-20 PROCEDURE — 99205 OFFICE O/P NEW HI 60 MIN: CPT | Performed by: THORACIC SURGERY (CARDIOTHORACIC VASCULAR SURGERY)

## 2022-07-20 ASSESSMENT — ENCOUNTER SYMPTOMS
FEVER: 0
NEUROLOGICAL NEGATIVE: 1
ORTHOPNEA: 0
PSYCHIATRIC NEGATIVE: 1
SPEECH CHANGE: 0
PND: 0
PHOTOPHOBIA: 0
BRUISES/BLEEDS EASILY: 0
HALLUCINATIONS: 0
DOUBLE VISION: 0
EYE PAIN: 0
SEIZURES: 0
HEADACHES: 0
TREMORS: 0
HEMOPTYSIS: 0
DIZZINESS: 0
RESPIRATORY NEGATIVE: 1
FOCAL WEAKNESS: 0
MEMORY LOSS: 0
CARDIOVASCULAR NEGATIVE: 1
NAUSEA: 0
POLYDIPSIA: 0
VOMITING: 0
ABDOMINAL PAIN: 0
PALPITATIONS: 0
BLOOD IN STOOL: 0
COUGH: 0
SHORTNESS OF BREATH: 0
CHILLS: 0
GASTROINTESTINAL NEGATIVE: 1
EYES NEGATIVE: 1
WEIGHT LOSS: 0

## 2022-07-20 ASSESSMENT — FIBROSIS 4 INDEX
FIB4 SCORE: 3.5
FIB4 SCORE: 3.5

## 2022-07-20 ASSESSMENT — PATIENT HEALTH QUESTIONNAIRE - PHQ9: CLINICAL INTERPRETATION OF PHQ2 SCORE: 0

## 2022-07-20 NOTE — PROGRESS NOTES
Patient here for TAVR IC consult. Per Dr. Saunders, patient needs to get stronger before proceeding with TAVR and recommends 2mo FU.    Met with patient and daughter after consult. Answered their questions and provided my business card with direct phone number should they have any questions or concerns prior to their FU.

## 2022-07-20 NOTE — PROGRESS NOTES
"CARDIOLOGY STRUCTURAL HEART CONSULTATION    PCP: Marcelino Obrien M.D.  REFERRING : Duane Bass MD    1. Severe aortic stenosis    2. Frailty    3. Skin lesion of back        Leslie Friend has aortic stenosis, somewhat ambiguous symptoms status as well as severity which may have been overestimated on the echocardiogram obtained during the hospitalization with hip fracture.  At this juncture, she is too frail to undergo aortic valve replacement though before the hip, fracture she was apparently in a much more reasonable shape.  I advised expectant management we will see her back in 2 months.  She will complete the pre-TAVR CTA as well as cardiac surgery assessment but hold off the invasive evaluation for the time being.    I advised that she seek out dermatologic evaluation for the skin lesion on the back.    Follow up: in 2 months    Chief Complaint   Patient presents with   • Aortic Stenosis     NP Dx: Severe aortic stenosis       History: Leslie Friend is a 87 y.o. female with history of osteoporosis, recent fragility fracture of the femur presenting for assessment of aortic stenosis.  She suffered a trip and fall and hip fracture and was incidentally discovered as having severe aortic stenosis.  The mean gradient was about 40 mmHg and there was severely restricted valve motion.  She is accompanied by her daughter who serves as .  The 2 used to walk in the park and the daughter notices she is more winded and fatigued than she had been in years past though she had attributed this to getting older.  She has never had overt congestive heart failure but did have orthostasis regularly.      ROS:   10 point review systems is otherwise negative except as per the HPI    PE:  /80 (BP Location: Left arm, Patient Position: Sitting, BP Cuff Size: Adult)   Pulse 78   Resp 16   Ht 1.549 m (5' 1\")   Wt 34.5 kg (76 lb)   SpO2 98%   BMI 14.36 kg/m²   Gen: no acute " distress  HEENT: Symmetric face. Anicteric sclerae. Moist mucus membranes  NECK: No JVD. No lymphadenopathy  CARDIAC: Normal S1 and S2, early peaking systolic murmur  VASCULATURE: carotids are normal bilaterally without bruit  RESP: Clear to auscultation bilaterally  ABD: Soft, non-tender, non-distended  EXT: No edema, no clubbing or cyanosis  SKIN: Warm and dry  NEURO: No gross deficits  PSYCH: Appropriate affect, participates in conversation    History reviewed. No pertinent past medical history.  Past Surgical History:   Procedure Laterality Date   • FEMUR NAILING INTRAMEDULLARY  6/22/2022    Procedure: LEFT FEMUR SHORT NAIL;  Surgeon: Ashok Santiago M.D.;  Location: SURGERY [facility];  Service: Orthopedics     No Known Allergies  Outpatient Encounter Medications as of 7/20/2022   Medication Sig Dispense Refill   • ferrous sulfate 325 (65 Fe) MG tablet Take 1 Tablet by mouth every Monday, Wednesday, and Friday. 90 Tablet 2   • amLODIPine (NORVASC) 5 MG Tab Take 1 Tablet by mouth every day. 30 Tablet 1   • metoprolol SR (TOPROL XL) 25 MG TABLET SR 24 HR Take 0.5 Tablets by mouth every day. 30 Tablet 1   • omeprazole (PRILOSEC) 20 MG delayed-release capsule Take 1 Capsule by mouth every day. 30 Capsule 0   • senna-docusate (PERICOLACE OR SENOKOT S) 8.6-50 MG Tab Take 2 Tablets by mouth 2 times a day. 30 Tablet 0   • aspirin (ASA) 81 MG Chew Tab chewable tablet Chew 1 Tablet 2 times a day for 26 days. 52 Tablet 0   • [DISCONTINUED] zoledronic Acid (RECLAST) 5 MG/100ML Solution IVPB premix Infuse 100 mL into a venous catheter Once Every 12 Months. (Patient not taking: Reported on 7/20/2022) 100 mL 0     No facility-administered encounter medications on file as of 7/20/2022.     Social History     Socioeconomic History   • Marital status:      Spouse name: Not on file   • Number of children: Not on file   • Years of education: Not on file   • Highest education level: Not on file   Occupational History   •  Not on file   Tobacco Use   • Smoking status: Never Smoker   • Smokeless tobacco: Never Used   Vaping Use   • Vaping Use: Never used   Substance and Sexual Activity   • Alcohol use: No   • Drug use: No   • Sexual activity: Never   Other Topics Concern   • Not on file   Social History Narrative   • Not on file     Social Determinants of Health     Financial Resource Strain: Not on file   Food Insecurity: Not on file   Transportation Needs: Not on file   Physical Activity: Not on file   Stress: Not on file   Social Connections: Not on file   Intimate Partner Violence: Not on file   Housing Stability: Not on file     History reviewed. No pertinent family history.      Studies  No results found for: CHOLSTRLTOT, LDL, HDL, TRIGLYCERIDE    Lab Results   Component Value Date/Time    SODIUM 139 06/25/2022 05:28 AM    POTASSIUM 3.9 06/25/2022 05:28 AM    CHLORIDE 107 06/25/2022 05:28 AM    CO2 24 06/25/2022 05:28 AM    GLUCOSE 101 (H) 06/25/2022 05:28 AM    BUN 15 06/25/2022 05:28 AM    CREATININE 0.41 (L) 06/25/2022 05:28 AM     Lab Results   Component Value Date/Time    ALKPHOSPHAT 56 06/22/2022 02:32 AM    ASTSGOT 20 06/22/2022 02:32 AM    ALTSGPT 11 06/22/2022 02:32 AM    TBILIRUBIN 0.3 06/22/2022 02:32 AM        For this encounter I reviewed the following medical records BMP, Lipid profile and CBC     For this encounter I directly reviewed ECG tracings and Echo images.  Severe aortic stenosis. I otherwise agree with the interpretation in the EHR.    40-54 minutes of physician total time spent on the date of the encounter ([id number])

## 2022-07-20 NOTE — PROGRESS NOTES
Valve Program Functional Assessment: Pre TAVR    [id number]   1a) Showering/bathing: 3  1b) Walking 1 block on ground: 3  1c) Hurrying or jogging:3  2) Swelling: 3  3) Fatigue: 5  4) Shortness of breath: 6  5) Sleep sitting up: 4  6) Limited enjoyment of life: 4  7) Spend the rest of your life with HF: 1  8a) Hobbies, recreational activities:4  8b) Working or doing household chores:3  8c) Visiting family or friends: 4    5 meter walk test- pt is unstable to walk        Strength   1) _14_____ kg  2) _14_____ kg  3) _12_____ kg  AVG:__14____    BRITO ADLs  Patient independently preforms...   - Bathing: No  - Dressing: Yes   - Toileting: Yes   - Transferring: Yes   - Continence: Yes   - Feeding: Yes   Total Score: _5_/6    Living Situation  Patient lives: with spouse, with adult child or relative    Mobility Aids   Patient uses: cane, walker, wheelchair      FRAILTY SCORE: _3_/ 4

## 2022-07-21 ENCOUNTER — HOSPITAL ENCOUNTER (OUTPATIENT)
Dept: RADIOLOGY | Facility: MEDICAL CENTER | Age: 87
End: 2022-07-21
Attending: INTERNAL MEDICINE
Payer: MEDICARE

## 2022-07-21 DIAGNOSIS — Z01.810 PRE-PROCEDURAL CARDIOVASCULAR EXAMINATION: ICD-10-CM

## 2022-07-21 DIAGNOSIS — I35.0 SEVERE AORTIC STENOSIS: ICD-10-CM

## 2022-07-21 PROCEDURE — 71275 CT ANGIOGRAPHY CHEST: CPT | Mod: MG

## 2022-07-21 PROCEDURE — 700117 HCHG RX CONTRAST REV CODE 255: Performed by: INTERNAL MEDICINE

## 2022-07-21 RX ADMIN — IOHEXOL 80 ML: 350 INJECTION, SOLUTION INTRAVENOUS at 15:17

## 2022-07-22 ENCOUNTER — TELEPHONE (OUTPATIENT)
Dept: ONCOLOGY | Facility: MEDICAL CENTER | Age: 87
End: 2022-07-22
Payer: MEDICARE

## 2022-07-27 ENCOUNTER — TELEPHONE (OUTPATIENT)
Dept: ONCOLOGY | Facility: MEDICAL CENTER | Age: 87
End: 2022-07-27
Payer: MEDICARE

## 2022-08-02 ENCOUNTER — OFFICE VISIT (OUTPATIENT)
Dept: INTERNAL MEDICINE | Facility: OTHER | Age: 87
End: 2022-08-02
Payer: MEDICARE

## 2022-08-02 VITALS
SYSTOLIC BLOOD PRESSURE: 158 MMHG | BODY MASS INDEX: 14.46 KG/M2 | DIASTOLIC BLOOD PRESSURE: 70 MMHG | OXYGEN SATURATION: 96 % | WEIGHT: 76.6 LBS | HEIGHT: 61 IN | TEMPERATURE: 99.2 F | HEART RATE: 91 BPM

## 2022-08-02 DIAGNOSIS — I35.0 SYMPTOMATIC SEVERE AORTIC STENOSIS WITH NORMAL EJECTION FRACTION: ICD-10-CM

## 2022-08-02 DIAGNOSIS — D62 ACUTE BLOOD LOSS ANEMIA: ICD-10-CM

## 2022-08-02 DIAGNOSIS — E44.0 MODERATE PROTEIN-CALORIE MALNUTRITION (HCC): ICD-10-CM

## 2022-08-02 DIAGNOSIS — E04.2 MULTINODULAR GOITER: ICD-10-CM

## 2022-08-02 PROCEDURE — 99214 OFFICE O/P EST MOD 30 MIN: CPT | Mod: GC | Performed by: STUDENT IN AN ORGANIZED HEALTH CARE EDUCATION/TRAINING PROGRAM

## 2022-08-02 RX ORDER — SULFAMETHOXAZOLE AND TRIMETHOPRIM 800; 160 MG/1; MG/1
1 TABLET ORAL 2 TIMES DAILY
COMMUNITY
Start: 2022-07-27 | End: 2022-08-16

## 2022-08-02 ASSESSMENT — FIBROSIS 4 INDEX: FIB4 SCORE: 3.5

## 2022-08-02 NOTE — PATIENT INSTRUCTIONS
Thank you for visiting today!  Please follow-up in 5 weeks  Please follow-up on referrals and schedule an appointment with [facility].   You have a goiter on imaging we are doing to do test to make sure it is not harmful  Please get lab work done at least 5 days before next visit.  Please try and eat healthy, get at least 30 minutes of cardiovascular exercise a day to help keep your health as best as it can be.  If you have any questions or concerns please feel free to contact us at [phone].  If you feel like you are experiencing a medical emergency please seek immediate medical attention at an urgent care or in the emergency department.

## 2022-08-04 NOTE — PROGRESS NOTES
"    Established Patient    Patient Care Team:  Marcelino Obrien M.D. as PCP - General (Internal Medicine)    Leslie Friend is a 87 y.o. female who presents today with the following Chief Complaint(s): Follow up for Diagnoses of Symptomatic severe aortic stenosis with normal ejection fraction, Moderate protein-calorie malnutrition (HCC), Multinodular goiter, and Acute blood loss anemia were pertinent to this visit.    Translation software was used with  present online during interview.    HPI:  1. Symptomatic severe aortic stenosis with normal ejection fraction  Patient following up with cardiology and cardiothoracic surgery, permissive hypertension, pending improvement in overall health \"I need to be able to walk on my own about a block before they will do the surgery.\"  Cardiothoracic's planning for TAVR, per patient goal date is sometime in October.    2. Moderate protein-calorie malnutrition (HCC)  Patient states she is eating only 1 Ensure shake along with her regular meals, mostly Mexican style diet, patient is still without weight gain and with a BMI of 14.47 will have a difficult time healing.  Has not yet been seen by home nutritional services, but is agreeable to following up with our nutritional services here in office.  Patient also with some loose stools on Ensure.  Has tried other nutritional supplements as well with the same result.      3. Multinodular goiter  Imaging work-up with CT TAVR chest abdomen and pelvis showing multi nodule goiter. Patient without any recent weight gain or loss, patient denies any heat or cold intolerance, polydipsia, symptomatically appears euthyroid.  Patient states she has no history of thyroid issues or goiter in the past.    4. Acute blood loss anemia  Patient has a history of acute blood loss anemia, tolerating iron supplements well.      ROS:     General: No fevers, chills, night sweats, weight loss or gain  HEENT: No hearing changes, vision " "changes, eye pain, ear pain, nasal discharge, sore throat  Neck: No swelling in neck  Pulmonary: No shortness of breath, cough, sputum, or hemoptysis  Cardiovascular: No chest pain, palpitations, or LE swelling  GI: No nausea, vomiting, diarrhea, constipation, abdominal pain, hematochezia or melena  : No dysuria or frequency  Neuro: No focal weakness, no general weakness, no headaches, no lightheadedness, no dizziness  Psych: No anxiety or depression    No past medical history on file.  Social History     Tobacco Use   • Smoking status: Never Smoker   • Smokeless tobacco: Never Used   Vaping Use   • Vaping Use: Never used   Substance Use Topics   • Alcohol use: No   • Drug use: No     Current Outpatient Medications   Medication Sig Dispense Refill   • sulfamethoxazole-trimethoprim (BACTRIM DS) 800-160 MG tablet Take 1 Tablet by mouth 2 times a day.     • ferrous sulfate 325 (65 Fe) MG tablet Take 1 Tablet by mouth every Monday, Wednesday, and Friday. 90 Tablet 2   • amLODIPine (NORVASC) 5 MG Tab Take 1 Tablet by mouth every day. 30 Tablet 1   • metoprolol SR (TOPROL XL) 25 MG TABLET SR 24 HR Take 0.5 Tablets by mouth every day. 30 Tablet 1   • senna-docusate (PERICOLACE OR SENOKOT S) 8.6-50 MG Tab Take 2 Tablets by mouth 2 times a day. (Patient not taking: Reported on 8/2/2022) 30 Tablet 0     No current facility-administered medications for this visit.       Physical Exam:  BP (!) 158/70 (BP Location: Right arm, Patient Position: Sitting, BP Cuff Size: Small adult)   Pulse 91   Temp 37.3 °C (99.2 °F) (Temporal)   Ht 1.549 m (5' 1\")   Wt 34.7 kg (76 lb 9.6 oz)   SpO2 96%   BMI 14.47 kg/m²   General: Frail, poorly nourished, elderly female appears stated age.  HEENT: NC/AT, PERRL, EOMI, no scleral icterus or conjunctival pallor, fair dentition, no nasal discharge or oral erythema or exudates.   Neck: Supple, No cervical or supraclavicular LAD  CV: 3 out of 6 holosystolic ejection murmur best heard at right " upper sternal border, no murmurs gallops or Rubs, no JVD  Pulm: LCAB, no crackles, rales, rhonchi, or wheezing  GI: Normal bowel sounds, abdomen soft, nontender, nondistended to deep or light palpation in all 4 quadrants, no HSM.  MSK: Radial and dorsalis pedis pulses 2+ and equal bilaterally, respectively.  Strength 5 out of 5 in upper and lower extremities.  No lower extremity edema  Neuro: Patient is alert and oriented x3, no focal deficits  Psych: Appropriate mood and affect       Assessment and Plan:   1. Symptomatic severe aortic stenosis with normal ejection fraction  Patient currently follows with cardiology and CT surgery, currently allowing for permissive hypertension to help with flow.  Patient wishes to proceed forward once baseline health improved with surgery tentatively planned for October.  - Follow with cardiology  - Follow with CT surgery    2. Moderate protein-calorie malnutrition (HCC)  Patient still without weight gain on this visit BMI 14.47, not taking Ensure shakes has not seen nutrition yet,  -Encourage patient for daily shakes  - Encourage patient to use bulking agent with stool to avoid loose stools.  - Schedule patient for nutrition appointment in this clinic  - Referral to Nutrition Services  3. Multinodular goiter  Patient has CT findings consistent with multinodular goiter, no history, denies any B symptoms, clinically euthyroid.  We will work-up with labs as below, could consider ultrasound thyroid and possible biopsy depending on work-up as well as anti-TPO antibodies, discussed with patient that we cannot exclude malignancy at this time, however given her age and advanced frailty, patient would likely be a poor candidate for any sort of chemo/immunotherapy/surgical resection.  - TSH WITH REFLEX TO FT4; Future      4. Acute blood loss anemia  Patient has a history of acute blood loss anemia secondary to her hip fracture, and is now been several months on iron supplementation  -  Continue to monitor clinically and on labs  - CBC WITHOUT DIFFERENTIAL; Future       Return in about 5 weeks (around 9/6/2022).    Patient Instructions   Thank you for visiting today!  Please follow-up in 5 weeks  Please follow-up on referrals and schedule an appointment with [facility].   You have a goiter on imaging we are doing to do test to make sure it is not harmful  Please get lab work done at least 5 days before next visit.  Please try and eat healthy, get at least 30 minutes of cardiovascular exercise a day to help keep your health as best as it can be.  If you have any questions or concerns please feel free to contact us at [phone].  If you feel like you are experiencing a medical emergency please seek immediate medical attention at an urgent care or in the emergency department.       Marcelino Obrien M.D. PGY 2  [employer]    This note was created using voice recognition software.  While every attempt is made to ensure accuracy of transcription, occasionally errors occur.

## 2022-08-08 ENCOUNTER — HOSPITAL ENCOUNTER (OUTPATIENT)
Dept: LAB | Facility: MEDICAL CENTER | Age: 87
End: 2022-08-08
Attending: STUDENT IN AN ORGANIZED HEALTH CARE EDUCATION/TRAINING PROGRAM
Payer: MEDICARE

## 2022-08-08 DIAGNOSIS — E04.2 MULTINODULAR GOITER: ICD-10-CM

## 2022-08-08 DIAGNOSIS — D62 ACUTE BLOOD LOSS ANEMIA: ICD-10-CM

## 2022-08-08 LAB
ERYTHROCYTE [DISTWIDTH] IN BLOOD BY AUTOMATED COUNT: 59.3 FL (ref 35.9–50)
HCT VFR BLD AUTO: 38 % (ref 37–47)
HGB BLD-MCNC: 12 G/DL (ref 12–16)
MCH RBC QN AUTO: 32.1 PG (ref 27–33)
MCHC RBC AUTO-ENTMCNC: 31.6 G/DL (ref 33.6–35)
MCV RBC AUTO: 101.6 FL (ref 81.4–97.8)
PLATELET # BLD AUTO: 278 K/UL (ref 164–446)
PMV BLD AUTO: 10.4 FL (ref 9–12.9)
RBC # BLD AUTO: 3.74 M/UL (ref 4.2–5.4)
T4 FREE SERPL-MCNC: 1.59 NG/DL (ref 0.93–1.7)
TSH SERPL DL<=0.005 MIU/L-ACNC: 0.01 UIU/ML (ref 0.38–5.33)
WBC # BLD AUTO: 5.4 K/UL (ref 4.8–10.8)

## 2022-08-08 PROCEDURE — 84439 ASSAY OF FREE THYROXINE: CPT

## 2022-08-08 PROCEDURE — 84443 ASSAY THYROID STIM HORMONE: CPT

## 2022-08-08 PROCEDURE — 36415 COLL VENOUS BLD VENIPUNCTURE: CPT

## 2022-08-08 PROCEDURE — 85027 COMPLETE CBC AUTOMATED: CPT

## 2022-08-10 ENCOUNTER — OUTPATIENT INFUSION SERVICES (OUTPATIENT)
Dept: ONCOLOGY | Facility: MEDICAL CENTER | Age: 87
End: 2022-08-10
Attending: STUDENT IN AN ORGANIZED HEALTH CARE EDUCATION/TRAINING PROGRAM
Payer: MEDICARE

## 2022-08-10 VITALS
HEART RATE: 76 BPM | OXYGEN SATURATION: 98 % | SYSTOLIC BLOOD PRESSURE: 165 MMHG | TEMPERATURE: 97.1 F | WEIGHT: 76.72 LBS | RESPIRATION RATE: 16 BRPM | HEIGHT: 56 IN | DIASTOLIC BLOOD PRESSURE: 82 MMHG | BODY MASS INDEX: 17.26 KG/M2

## 2022-08-10 DIAGNOSIS — M81.0 OSTEOPOROSIS, UNSPECIFIED OSTEOPOROSIS TYPE, UNSPECIFIED PATHOLOGICAL FRACTURE PRESENCE: ICD-10-CM

## 2022-08-10 LAB
CA-I BLD ISE-SCNC: 1.19 MMOL/L (ref 1.1–1.3)
CREAT BLD-MCNC: 0.6 MG/DL (ref 0.5–1.4)

## 2022-08-10 PROCEDURE — 700111 HCHG RX REV CODE 636 W/ 250 OVERRIDE (IP): Performed by: STUDENT IN AN ORGANIZED HEALTH CARE EDUCATION/TRAINING PROGRAM

## 2022-08-10 PROCEDURE — 96365 THER/PROPH/DIAG IV INF INIT: CPT

## 2022-08-10 PROCEDURE — 82565 ASSAY OF CREATININE: CPT

## 2022-08-10 PROCEDURE — 82330 ASSAY OF CALCIUM: CPT

## 2022-08-10 RX ORDER — ZOLEDRONIC ACID 5 MG/100ML
5 INJECTION, SOLUTION INTRAVENOUS ONCE
Status: CANCELLED | OUTPATIENT
Start: 2023-08-11 | End: 2023-08-11

## 2022-08-10 RX ORDER — ZOLEDRONIC ACID 5 MG/100ML
5 INJECTION, SOLUTION INTRAVENOUS ONCE
Status: COMPLETED | OUTPATIENT
Start: 2022-08-10 | End: 2022-08-10

## 2022-08-10 RX ADMIN — ZOLEDRONIC ACID 5 MG: 5 INJECTION, SOLUTION INTRAVENOUS at 12:22

## 2022-08-10 ASSESSMENT — FIBROSIS 4 INDEX: FIB4 SCORE: 1.89

## 2022-08-10 NOTE — PROGRESS NOTES
Pt presents to [facility] for yearly zoledronic acid infusion. Established PIV in R FA; brisk blood return observed and pt tolerated well. Istat labs drawn (cre:0.6 and gallo:1.19) and within treatable parameters. Zoledronic acid infused with no s/s of adverse reactions. PIV flushed and brisk blood return observed before removing; gauze/coband dressing applied. Pt discharged w/ caregiver/daughter with all personal belongings and in NAD.

## 2022-08-15 ENCOUNTER — TELEPHONE (OUTPATIENT)
Dept: INTERNAL MEDICINE | Facility: OTHER | Age: 87
End: 2022-08-15
Payer: MEDICARE

## 2022-08-15 NOTE — TELEPHONE ENCOUNTER
Marcelino Obrien M.D.  Nishi Mayen, Med Ass't; [facility] 2 hours ago (9:56 AM)     CK  Results of this imaging was discussed at 8/2/2022 visit, plan as per note from that visit.

## 2022-08-15 NOTE — TELEPHONE ENCOUNTER
Received a letter from [facility] starting the following     During their structural heart work up, they had incidental findings on their CT chest, abdomen, and pelvis.     Please review CT report.

## 2022-09-13 ENCOUNTER — APPOINTMENT (OUTPATIENT)
Dept: INTERNAL MEDICINE | Facility: OTHER | Age: 87
End: 2022-09-13
Payer: MEDICARE

## 2022-09-21 ENCOUNTER — APPOINTMENT (OUTPATIENT)
Dept: INTERNAL MEDICINE | Facility: OTHER | Age: 87
End: 2022-09-21
Payer: MEDICARE

## 2022-10-19 ENCOUNTER — OFFICE VISIT (OUTPATIENT)
Dept: CARDIOLOGY | Facility: MEDICAL CENTER | Age: 87
End: 2022-10-19
Payer: MEDICARE

## 2022-10-19 ENCOUNTER — NON-PROVIDER VISIT (OUTPATIENT)
Dept: CARDIOLOGY | Facility: MEDICAL CENTER | Age: 87
End: 2022-10-19
Payer: MEDICARE

## 2022-10-19 VITALS
WEIGHT: 78.9 LBS | HEART RATE: 90 BPM | OXYGEN SATURATION: 98 % | HEIGHT: 61 IN | RESPIRATION RATE: 16 BRPM | BODY MASS INDEX: 14.9 KG/M2 | SYSTOLIC BLOOD PRESSURE: 130 MMHG | DIASTOLIC BLOOD PRESSURE: 82 MMHG

## 2022-10-19 DIAGNOSIS — I35.0 NONRHEUMATIC AORTIC VALVE STENOSIS: ICD-10-CM

## 2022-10-19 DIAGNOSIS — S72.142A CLOSED DISPLACED INTERTROCHANTERIC FRACTURE OF LEFT FEMUR, INITIAL ENCOUNTER (HCC): ICD-10-CM

## 2022-10-19 DIAGNOSIS — I35.1 NONRHEUMATIC AORTIC VALVE INSUFFICIENCY: ICD-10-CM

## 2022-10-19 DIAGNOSIS — I49.1 APC (ATRIAL PREMATURE CONTRACTIONS): ICD-10-CM

## 2022-10-19 DIAGNOSIS — I49.3 PVC'S (PREMATURE VENTRICULAR CONTRACTIONS): ICD-10-CM

## 2022-10-19 DIAGNOSIS — R54 FRAILTY: ICD-10-CM

## 2022-10-19 PROCEDURE — 99214 OFFICE O/P EST MOD 30 MIN: CPT | Performed by: INTERNAL MEDICINE

## 2022-10-19 RX ORDER — CEPHALEXIN 500 MG/1
CAPSULE ORAL
COMMUNITY
Start: 2022-10-15 | End: 2023-02-09

## 2022-10-19 RX ORDER — AMLODIPINE BESYLATE 5 MG/1
5 TABLET ORAL DAILY
Qty: 90 TABLET | Refills: 3 | Status: SHIPPED | OUTPATIENT
Start: 2022-10-19 | End: 2023-02-09

## 2022-10-19 ASSESSMENT — FIBROSIS 4 INDEX: FIB4 SCORE: 1.89

## 2022-10-19 NOTE — PROGRESS NOTES
Patient enrolled in the 3 day Zio XT Holter monitoring program, per Clive Saunders M.D. with a .  >In clinic hook up, monitor S/N [id number].  >Pending EOS.

## 2022-10-19 NOTE — PROGRESS NOTES
"CARDIOLOGY STRUCTURAL HEART FOLLOWUP    PCP: Marcelino Obrien M.D.  REFERRING: Duane Bass MD     1. Nonrheumatic aortic valve stenosis    2. Closed displaced intertrochanteric fracture of left femur, initial encounter ([facility])    3. Frailty        Leslie Friend has severe aortic stenosis with ambiguous but probably class II symptoms.  I advised continued rehab from the hip fracture with ongoing symptomatic assessment by herself and the family.  I will update an echocardiogram, 48-hour Holter monitor and laboratory profile including a BNP.    Follow up: 6 weeks    Chief Complaint   Patient presents with    Aortic Stenosis     F/V Dx: Severe aortic stenosis       History: Leslie Friend is a 87 y.o. female with history of osteoporosis, femur fragility fracture presenting for follow-up of aortic stenosis.  She was incidentally discovered to have a mean gradient of 40 mmHg and severely restricted valve motion while undergoing care for hip fracture in June.  She has since been recuperating well and is back to walking 2 blocks at a time.  She is primarily limited by hip pain but may have some exertional fatigue and shortness of breath as well.  She did have an episode of palpitations and chest discomfort lasting several hours.      ROS:   10 point review systems is otherwise negative except as per the HPI    PE:  /82 (BP Location: Left arm, Patient Position: Sitting, BP Cuff Size: Adult)   Pulse 90   Resp 16   Ht 1.549 m (5' 1\")   Wt 35.8 kg (78 lb 14.4 oz)   SpO2 98%   BMI 14.91 kg/m²   Gen: no acute distress  HEENT: Symmetric face. Anicteric sclerae. Moist mucus membranes  NECK: No JVD. No lymphadenopathy  CARDIAC: Regular, Normal S1, S2, +systolic murmur, mid peaking  VASCULATURE: Diminished and delayed upstroke  RESP: Clear to auscultation bilaterally  ABD: Soft, non-tender, non-distended  EXT: No edema, no clubbing or cyanosis  SKIN: Warm and dry  NEURO: No gross " deficits  PSYCH: Appropriate affect, participates in conversation    History reviewed. No pertinent past medical history.  No Known Allergies  Outpatient Encounter Medications as of 10/19/2022   Medication Sig Dispense Refill    cephALEXin (KEFLEX) 500 MG Cap TAKE 1 CAPSULE BY MOUTH 4 TIMES A DAY FOR 7 DAYS      amLODIPine (NORVASC) 5 MG Tab Take 1 Tablet by mouth every day. 90 Tablet 3    metoprolol SR (TOPROL XL) 25 MG TABLET SR 24 HR Take 0.5 Tablets by mouth every day. 30 Tablet 1    [DISCONTINUED] ferrous sulfate 325 (65 Fe) MG tablet Take 1 Tablet by mouth every Monday, Wednesday, and Friday. 90 Tablet 2    [DISCONTINUED] amLODIPine (NORVASC) 5 MG Tab Take 1 Tablet by mouth every day. 30 Tablet 1     No facility-administered encounter medications on file as of 10/19/2022.     Social History     Socioeconomic History    Marital status:      Spouse name: Not on file    Number of children: Not on file    Years of education: Not on file    Highest education level: Not on file   Occupational History    Not on file   Tobacco Use    Smoking status: Never    Smokeless tobacco: Never   Vaping Use    Vaping Use: Never used   Substance and Sexual Activity    Alcohol use: No    Drug use: No    Sexual activity: Never   Other Topics Concern    Not on file   Social History Narrative    Not on file     Social Determinants of Health     Financial Resource Strain: Not on file   Food Insecurity: Not on file   Transportation Needs: Not on file   Physical Activity: Not on file   Stress: Not on file   Social Connections: Not on file   Intimate Partner Violence: Not on file   Housing Stability: Not on file       Studies  No results found for: CHOLSTRLTOT, LDL, HDL, TRIGLYCERIDE    Lab Results   Component Value Date/Time    SODIUM 139 06/25/2022 05:28 AM    POTASSIUM 3.9 06/25/2022 05:28 AM    CHLORIDE 107 06/25/2022 05:28 AM    CO2 24 06/25/2022 05:28 AM    GLUCOSE 101 (H) 06/25/2022 05:28 AM    BUN 15 06/25/2022 05:28 AM     CREATININE 0.41 (L) 06/25/2022 05:28 AM     Lab Results   Component Value Date/Time    ALKPHOSPHAT 56 06/22/2022 02:32 AM    ASTSGOT 20 06/22/2022 02:32 AM    ALTSGPT 11 06/22/2022 02:32 AM    TBILIRUBIN 0.3 06/22/2022 02:32 AM            For this encounter I directly reviewed ECG tracings.  Sinus rhythm, no IVCD. I otherwise agree with the interpretation in the EHR.

## 2022-10-27 ENCOUNTER — TELEPHONE (OUTPATIENT)
Dept: CARDIOLOGY | Facility: MEDICAL CENTER | Age: 87
End: 2022-10-27
Payer: MEDICARE

## 2022-10-27 DIAGNOSIS — I35.0 NONRHEUMATIC AORTIC VALVE STENOSIS: ICD-10-CM

## 2022-10-27 PROCEDURE — 93248 EXT ECG>7D<15D REV&INTERPJ: CPT | Performed by: INTERNAL MEDICINE

## 2022-10-27 PROCEDURE — 93246 EXT ECG>7D<15D RECORDING: CPT | Performed by: INTERNAL MEDICINE

## 2022-11-04 ENCOUNTER — HOSPITAL ENCOUNTER (OUTPATIENT)
Dept: LAB | Facility: MEDICAL CENTER | Age: 87
End: 2022-11-04
Attending: INTERNAL MEDICINE
Payer: MEDICARE

## 2022-11-04 DIAGNOSIS — I35.0 NONRHEUMATIC AORTIC VALVE STENOSIS: ICD-10-CM

## 2022-11-04 LAB
ANION GAP SERPL CALC-SCNC: 9 MMOL/L (ref 7–16)
BUN SERPL-MCNC: 15 MG/DL (ref 8–22)
CALCIUM SERPL-MCNC: 9.7 MG/DL (ref 8.5–10.5)
CHLORIDE SERPL-SCNC: 105 MMOL/L (ref 96–112)
CO2 SERPL-SCNC: 23 MMOL/L (ref 20–33)
CREAT SERPL-MCNC: 0.42 MG/DL (ref 0.5–1.4)
ERYTHROCYTE [DISTWIDTH] IN BLOOD BY AUTOMATED COUNT: 49.1 FL (ref 35.9–50)
FERRITIN SERPL-MCNC: 53.5 NG/ML (ref 10–291)
GFR SERPLBLD CREATININE-BSD FMLA CKD-EPI: 94 ML/MIN/1.73 M 2
GLUCOSE SERPL-MCNC: 101 MG/DL (ref 65–99)
HCT VFR BLD AUTO: 40.6 % (ref 37–47)
HGB BLD-MCNC: 13.2 G/DL (ref 12–16)
IRON SATN MFR SERPL: 26 % (ref 15–55)
IRON SERPL-MCNC: 94 UG/DL (ref 40–170)
MCH RBC QN AUTO: 32.5 PG (ref 27–33)
MCHC RBC AUTO-ENTMCNC: 32.5 G/DL (ref 33.6–35)
MCV RBC AUTO: 100 FL (ref 81.4–97.8)
NT-PROBNP SERPL IA-MCNC: 696 PG/ML (ref 0–125)
PLATELET # BLD AUTO: 254 K/UL (ref 164–446)
PMV BLD AUTO: 10.6 FL (ref 9–12.9)
POTASSIUM SERPL-SCNC: 4.4 MMOL/L (ref 3.6–5.5)
RBC # BLD AUTO: 4.06 M/UL (ref 4.2–5.4)
SODIUM SERPL-SCNC: 137 MMOL/L (ref 135–145)
TIBC SERPL-MCNC: 361 UG/DL (ref 250–450)
UIBC SERPL-MCNC: 267 UG/DL (ref 110–370)
WBC # BLD AUTO: 6.8 K/UL (ref 4.8–10.8)

## 2022-11-04 PROCEDURE — 83880 ASSAY OF NATRIURETIC PEPTIDE: CPT | Mod: GA

## 2022-11-04 PROCEDURE — 80048 BASIC METABOLIC PNL TOTAL CA: CPT

## 2022-11-04 PROCEDURE — 36415 COLL VENOUS BLD VENIPUNCTURE: CPT | Mod: GA

## 2022-11-04 PROCEDURE — 83550 IRON BINDING TEST: CPT | Mod: GA

## 2022-11-04 PROCEDURE — 82728 ASSAY OF FERRITIN: CPT | Mod: GA

## 2022-11-04 PROCEDURE — 85027 COMPLETE CBC AUTOMATED: CPT

## 2022-11-04 PROCEDURE — 83540 ASSAY OF IRON: CPT | Mod: GA

## 2022-11-22 ENCOUNTER — APPOINTMENT (OUTPATIENT)
Dept: INTERNAL MEDICINE | Facility: OTHER | Age: 87
End: 2022-11-22
Payer: MEDICARE

## 2022-11-30 ENCOUNTER — HOSPITAL ENCOUNTER (OUTPATIENT)
Dept: CARDIOLOGY | Facility: MEDICAL CENTER | Age: 87
End: 2022-11-30
Attending: INTERNAL MEDICINE
Payer: MEDICARE

## 2022-11-30 DIAGNOSIS — I35.0 NONRHEUMATIC AORTIC VALVE STENOSIS: ICD-10-CM

## 2022-11-30 LAB
LV EJECT FRACT  99904: 45
LV EJECT FRACT MOD 2C 99903: 31.19
LV EJECT FRACT MOD 4C 99902: 46.08
LV EJECT FRACT MOD BP 99901: 41.53

## 2022-11-30 PROCEDURE — 93306 TTE W/DOPPLER COMPLETE: CPT | Mod: 26 | Performed by: INTERNAL MEDICINE

## 2022-11-30 PROCEDURE — 93306 TTE W/DOPPLER COMPLETE: CPT

## 2022-12-14 ENCOUNTER — TELEPHONE (OUTPATIENT)
Dept: CARDIOLOGY | Facility: MEDICAL CENTER | Age: 87
End: 2022-12-14
Payer: MEDICARE

## 2022-12-14 NOTE — LETTER
Diciembre 16, 2022        Leslie Allison [place] Grageda  [address] [place] [address]        Querida Leslie,    No hemos podido comunicarnos con usted con respecto a la reprogramación de rayo kimberley de seguimiento con el Dr. Saunders. Le gustaría verlo en verla antes del 02/09/2023.     Llame a nuestra oficina al [phone] o [phone] para reprogramar raoy kimberley.       Julieta Colon, LIBANN, RN  [facility]

## 2022-12-14 NOTE — TELEPHONE ENCOUNTER
----- Message from Clive Saunders M.D. sent at 12/10/2022 11:37 AM PST -----  The ejection fraction is falling and indicates a need to move forward with assessment of aortic valve replacement.  The patient will need to come in for another appointment in the valve clinic

## 2022-12-14 NOTE — TELEPHONE ENCOUNTER
Attempted to reach patient with assistance from Jaciel (ID# [id number]) with [employer].    Left detailed message for patient to return call and schedule consult.           1st attempt

## 2022-12-15 NOTE — TELEPHONE ENCOUNTER
Attempted to reach patient with assistance from Raymundo (ID# [id number]) with [employer]. Left detailed message for patient to return call.             2nd attempt

## 2022-12-16 NOTE — TELEPHONE ENCOUNTER
Attempted to reach patient with assistance from Alexy (ID# [id number]) with [employer]. Left message for patient to return call and schedule sooner appointment.     Letter drafted in [place] and placed in mail for patient.           3rd attempt

## 2023-02-09 ENCOUNTER — OFFICE VISIT (OUTPATIENT)
Dept: CARDIOLOGY | Facility: MEDICAL CENTER | Age: 88
End: 2023-02-09
Payer: MEDICARE

## 2023-02-09 VITALS
BODY MASS INDEX: 15.48 KG/M2 | SYSTOLIC BLOOD PRESSURE: 140 MMHG | WEIGHT: 82 LBS | DIASTOLIC BLOOD PRESSURE: 90 MMHG | RESPIRATION RATE: 16 BRPM | HEIGHT: 61 IN | OXYGEN SATURATION: 96 % | HEART RATE: 76 BPM

## 2023-02-09 DIAGNOSIS — R54 FRAILTY: ICD-10-CM

## 2023-02-09 DIAGNOSIS — I35.0 SEVERE AORTIC STENOSIS: ICD-10-CM

## 2023-02-09 DIAGNOSIS — R03.0 ELEVATED BLOOD PRESSURE READING: ICD-10-CM

## 2023-02-09 PROCEDURE — 99215 OFFICE O/P EST HI 40 MIN: CPT | Performed by: INTERNAL MEDICINE

## 2023-02-09 ASSESSMENT — FIBROSIS 4 INDEX: FIB4 SCORE: 2.07

## 2023-02-09 NOTE — PROGRESS NOTES
"CARDIOLOGY STRUCTURAL HEART FOLLOWUP    PCP: Marcelino Obrien M.D.  REFERRING: Dr. Bass    1. Severe aortic stenosis    2. Frailty    3. Elevated blood pressure reading        ALKA Bellamy is clinically well with severe aortic stenosis, without overt cardiac symptoms and after reviewing the echocardiogram I believe stable ejection fraction.  Although her BNP is a bit elevated, I continue to advise expectant management as she does have risk factors for complications related to TAVR and I am unsure if she is a suitable candidate at this juncture.  Instead I advised proceeding with rehab efforts and reassessment in the future.    Follow up: 3 months with an updated echocardiogram and BNP    Chief Complaint   Patient presents with    Aortic Stenosis     F/V Dx: Nonrheumatic aortic valve stenosis       History: ALKA Bellamy is a 87 y.o. female with history of osteoporosis, femur fragility fracture and recent humerus fracture presenting for follow-up of severe aortic stenosis.  Last summer the mean gradient was found to be 40, in the fall it did decline to the 30s.  Initially interpreted the ejection fraction is mildly depressed but on review review I find the ejection fraction to be normal.  The patient is accompanied by her daughter and both state that she is feeling well and free of cardiovascular symptoms.  She no longer experiences chest pain or shortness of breath.  She has recuperated well from the hip fracture but is limited by overhead activities due to the recent humerus fracture.    She did complete a TAVR CTA, annulus area compatible with a small 23 valve but with a 10 mm left coronary height and 5 mm iliac artery as potential hazards.      ROS:   10 point review systems is otherwise negative except as per the HPI    PE:  BP (!) 140/90 (BP Location: Left arm, Patient Position: Sitting, BP Cuff Size: Adult)   Pulse 76   Resp 16   Ht 1.549 m (5' 1\")   Wt 37.2 kg (82 lb)   SpO2 96%   " BMI 15.49 kg/m²   Gen: no acute distress  HEENT: Symmetric face. Anicteric sclerae. Moist mucus membranes  NECK: No JVD. No lymphadenopathy  CARDIAC: Regular, Normal S1, S2, +systolic murmur  VASCULATURE: carotids are normal bilaterally without bruit  RESP: Clear to auscultation bilaterally  ABD: Soft, non-tender, non-distended  EXT: No edema, no clubbing or cyanosis  SKIN: Warm and dry  NEURO: No gross deficits  PSYCH: Appropriate affect, participates in conversation    History reviewed. No pertinent past medical history.  No Known Allergies  Outpatient Encounter Medications as of 2/9/2023   Medication Sig Dispense Refill    Calcium Carbonate Antacid (CALCIUM CARBONATE PO) Take  by mouth.      metoprolol SR (TOPROL XL) 25 MG TABLET SR 24 HR Take 0.5 Tablets by mouth every day. 30 Tablet 1    [DISCONTINUED] cephALEXin (KEFLEX) 500 MG Cap TAKE 1 CAPSULE BY MOUTH 4 TIMES A DAY FOR 7 DAYS (Patient not taking: Reported on 2/9/2023)      [DISCONTINUED] amLODIPine (NORVASC) 5 MG Tab Take 1 Tablet by mouth every day. (Patient not taking: Reported on 2/9/2023) 90 Tablet 3     No facility-administered encounter medications on file as of 2/9/2023.     Social History     Socioeconomic History    Marital status:      Spouse name: Not on file    Number of children: Not on file    Years of education: Not on file    Highest education level: Not on file   Occupational History    Not on file   Tobacco Use    Smoking status: Never    Smokeless tobacco: Never   Vaping Use    Vaping Use: Never used   Substance and Sexual Activity    Alcohol use: No    Drug use: No    Sexual activity: Never   Other Topics Concern    Not on file   Social History Narrative    Not on file     Social Determinants of Health     Financial Resource Strain: Not on file   Food Insecurity: Not on file   Transportation Needs: Not on file   Physical Activity: Not on file   Stress: Not on file   Social Connections: Not on file   Intimate Partner Violence: Not  on file   Housing Stability: Not on file       Studies  No results found for: CHOLSTRLTOT, LDL, HDL, TRIGLYCERIDE    Lab Results   Component Value Date/Time    SODIUM 137 11/04/2022 10:51 AM    POTASSIUM 4.4 11/04/2022 10:51 AM    CHLORIDE 105 11/04/2022 10:51 AM    CO2 23 11/04/2022 10:51 AM    GLUCOSE 101 (H) 11/04/2022 10:51 AM    BUN 15 11/04/2022 10:51 AM    CREATININE 0.42 (L) 11/04/2022 10:51 AM     Lab Results   Component Value Date/Time    ALKPHOSPHAT 56 06/22/2022 02:32 AM    ASTSGOT 20 06/22/2022 02:32 AM    ALTSGPT 11 06/22/2022 02:32 AM    TBILIRUBIN 0.3 06/22/2022 02:32 AM        I did spend 50 minutes during todays encounter

## 2023-06-26 ENCOUNTER — HOSPITAL ENCOUNTER (OUTPATIENT)
Dept: LAB | Facility: MEDICAL CENTER | Age: 88
End: 2023-06-26
Attending: INTERNAL MEDICINE
Payer: MEDICARE

## 2023-06-26 DIAGNOSIS — I35.0 SEVERE AORTIC STENOSIS: ICD-10-CM

## 2023-06-26 LAB — NT-PROBNP SERPL IA-MCNC: 1127 PG/ML (ref 0–125)

## 2023-06-26 PROCEDURE — 83880 ASSAY OF NATRIURETIC PEPTIDE: CPT | Mod: GA

## 2023-06-26 PROCEDURE — 36415 COLL VENOUS BLD VENIPUNCTURE: CPT | Mod: GA

## 2023-06-27 DIAGNOSIS — I35.0 SEVERE AORTIC STENOSIS: ICD-10-CM

## 2023-06-27 DIAGNOSIS — Z01.810 PRE-PROCEDURAL CARDIOVASCULAR EXAMINATION: ICD-10-CM

## 2023-06-27 DIAGNOSIS — I35.0 NONRHEUMATIC AORTIC (VALVE) STENOSIS: ICD-10-CM

## 2023-06-28 ENCOUNTER — OFFICE VISIT (OUTPATIENT)
Dept: CARDIOLOGY | Facility: MEDICAL CENTER | Age: 88
End: 2023-06-28
Attending: INTERNAL MEDICINE
Payer: MEDICARE

## 2023-06-28 ENCOUNTER — TELEPHONE (OUTPATIENT)
Dept: CARDIOLOGY | Facility: MEDICAL CENTER | Age: 88
End: 2023-06-28

## 2023-06-28 ENCOUNTER — DOCUMENTATION (OUTPATIENT)
Dept: CARDIOLOGY | Facility: MEDICAL CENTER | Age: 88
End: 2023-06-28

## 2023-06-28 VITALS
DIASTOLIC BLOOD PRESSURE: 82 MMHG | WEIGHT: 89 LBS | RESPIRATION RATE: 16 BRPM | HEART RATE: 84 BPM | HEIGHT: 61 IN | SYSTOLIC BLOOD PRESSURE: 124 MMHG | OXYGEN SATURATION: 97 % | BODY MASS INDEX: 16.8 KG/M2

## 2023-06-28 DIAGNOSIS — I35.0 SEVERE AORTIC STENOSIS: ICD-10-CM

## 2023-06-28 DIAGNOSIS — I35.0 NONRHEUMATIC AORTIC (VALVE) STENOSIS: ICD-10-CM

## 2023-06-28 DIAGNOSIS — R54 FRAILTY: ICD-10-CM

## 2023-06-28 DIAGNOSIS — Z01.810 PRE-PROCEDURAL CARDIOVASCULAR EXAMINATION: ICD-10-CM

## 2023-06-28 DIAGNOSIS — I10 HYPERTENSION, UNSPECIFIED TYPE: ICD-10-CM

## 2023-06-28 PROBLEM — D72.829 LEUKOCYTOSIS: Status: RESOLVED | Noted: 2022-06-21 | Resolved: 2023-06-28

## 2023-06-28 PROBLEM — D69.6 THROMBOCYTOPENIA (HCC): Status: RESOLVED | Noted: 2022-06-21 | Resolved: 2023-06-28

## 2023-06-28 PROBLEM — E03.8 OTHER SPECIFIED HYPOTHYROIDISM: Status: ACTIVE | Noted: 2023-06-28

## 2023-06-28 PROBLEM — D62 ACUTE BLOOD LOSS ANEMIA: Status: RESOLVED | Noted: 2022-06-22 | Resolved: 2023-06-28

## 2023-06-28 LAB — EKG IMPRESSION: NORMAL

## 2023-06-28 PROCEDURE — 3074F SYST BP LT 130 MM HG: CPT | Performed by: INTERNAL MEDICINE

## 2023-06-28 PROCEDURE — 3079F DIAST BP 80-89 MM HG: CPT | Performed by: INTERNAL MEDICINE

## 2023-06-28 PROCEDURE — 99213 OFFICE O/P EST LOW 20 MIN: CPT | Performed by: INTERNAL MEDICINE

## 2023-06-28 PROCEDURE — 99215 OFFICE O/P EST HI 40 MIN: CPT | Performed by: INTERNAL MEDICINE

## 2023-06-28 PROCEDURE — 93010 ELECTROCARDIOGRAM REPORT: CPT | Performed by: INTERNAL MEDICINE

## 2023-06-28 PROCEDURE — 93005 ELECTROCARDIOGRAM TRACING: CPT | Performed by: INTERNAL MEDICINE

## 2023-06-28 RX ORDER — TIZANIDINE 2 MG/1
TABLET ORAL
COMMUNITY
Start: 2023-06-19

## 2023-06-28 ASSESSMENT — FIBROSIS 4 INDEX: FIB4 SCORE: 2.09

## 2023-06-28 ASSESSMENT — PATIENT HEALTH QUESTIONNAIRE - PHQ9: CLINICAL INTERPRETATION OF PHQ2 SCORE: 0

## 2023-06-28 NOTE — PROGRESS NOTES
Valve Program Functional Assessment:     KCCQ12   1a) Showering/bathing: 3  1b) Walking 1 block on ground: 2  1c) Hurrying or joggin  2) Swellin  3) Fatigue: 6  4) Shortness of breath: 7  5) Sleep sitting up: 5  6) Limited enjoyment of life: 5  7) Spend the rest of your life with HF: 4  8a) Hobbies, recreational activities:4  8b) Working or doing household chores:4  8c) Visiting family or friends: 5    5 meter walk test  1) __10.35____ s/5m  2) __8.33____ s/5m  3) __9.33____ s/5m  AVG:_9.33______     Strength   1) _10_____ kg  2) _10_____ kg  3) _10_____ kg  AVG:__10____    BRITO ADLs  Patient independently preforms...   - Bathing: Yes   - Dressing: Yes   - Toileting: Yes   - Transferring: Yes   - Continence: Yes   - Feeding: Yes  Total Score: _6_/6    Living Situation  Patient lives: with spouse, with adult child   Mobility Aids   Patient uses: cane    FRAILTY SCORE: _3_/ 4

## 2023-06-28 NOTE — PROGRESS NOTES
"Valve Program Consultation: 06/28/2023 for tentative TAVR    Mental Status Assessment:  Appearance: frail  Behavior: normal  Mood/Affect: normal, quiet  Thought process/content: normal  Cognition: slightly limited due to language barrier  Functional ability: slightly limited, uses cane  Dental Concerns: dentures, patient denies s/s of active oral gum infection/irritation  Further mental assessment needed: no    Post-op Plan of Care:  Support systems: Jaiime (daughter) present at consult today, Rebecca (granddaughter)  Patient understands discharge plan: will need reinforcement from daughter and granddaughter due to language barrier  DME: cane  Home health warranted prior to procedure: no  Social concerns for discharge: none  PCP alerted of social concerns: n/a  POLST: none  Advance directive: none, [place] AD packet provided  Flu/PNA/COVID vaccines completed: UTD  Post-op goal: \"breath better\"  [place]  Medication instructions: hold all vitamins 7 days prior to procedure    Concerns prior to procedure: Patient will need to complete updated echo, scheduled during office visit.     Met with patient during New TAVR consult. Language Line  used during visit.     All patient's questions and concerns were addressed during this visit. They understood pre-operative and post-operative plan of care.    Reviewed patient TAVR education packet explaining that information is provided regarding preparation for TAVR the night prior, what to expect during the hospital stay, average LOS, and what to look out for post TAVR discharge. Explained that patient will require SBE prophylactic antibiotic prior to any dental treatment post TAVR.     Explained that patient should not eat or drink anything past midnight the day of the procedure. Encouraged patient to wear something clean and comfortable, easy to get on/off to check in Monday morning, time TBD. Patient may have friends and family in the " pre-operational area until patient is taken to the operating room, at which time family and friends will be asked to wait on floor 1 [facility] surgical waiting area. On completion of TAVR, heart team will update family. Once update is given, there is some time before family/friends may visit patient in their assigned room. Length of stay on average is one night, however patient may stay longer depending on specific needs at that time. Patient given printed instructions sheet with all above stated instructions. Patient states understanding of all material and education presented today and has no further questions at this time. Encouraged patient again, to contact me with any questions or concerns during this work-up process. Patient states understanding.    Improving

## 2023-06-28 NOTE — TELEPHONE ENCOUNTER
Patient is scheduled on 7-6-23 for a Pre TAVR angio with . No meds to stop and patient to check in at 6:30 for an 8:30 procedure. H&P was done on 6-28-23 by Dr. Saunders. Pre admit to call patient.

## 2023-06-28 NOTE — PROGRESS NOTES
"CARDIOLOGY STRUCTURAL HEART FOLLOWUP    PCP: Marcelino Obrien M.D.  REFERRING: Duane Bass MD    1. Severe aortic stenosis    2. Frailty    3. Hypertension, unspecified type        Leslie Friend has class II, probable D1 aortic stenosis and rising BNP.  The diagnosis will however require adjudication before proceeding with aortic valve replacement and I will arrange a cardiac catheterization, echocardiogram and CTS consultation.  We will need to review her films carefully to determine if transfemoral or alternate access is feasible.  Alternatively, balloon aortic valvuloplasty may be considered.    We discussed the risks, benefits and alternatives to TAVR including but not limited to a 10% risk of pacemaker, 5% risk of bleeding/access site complication, 2% risk of stroke, risk of contrast nephropathy and 2% risk of mortality.  She is at heightened risk due to age, small body frame and frailty.  The patient is in agreement with proceeding.    Follow up: 6 weeks    History: Leslie Friend is a 88 y.o. female history of osteoporosis, femur fragility fracture and recent humerus fracture presenting for follow-up of severe aortic stenosis.  Last summer the mean gradient was found to be 40, in the fall it did decline to the 30s.    Since our last evaluation she has been rehabbing well from the various fractures.  She still has limited overhead range of motion in the left arm.  She has returned to walking at the park with her daughter but moves slower than previously.  Her recent BNP showed an almost doubling of the value.  She has not developed any cardinal symptoms of valve disease.      PE:  /82 (BP Location: Left arm, Patient Position: Sitting, BP Cuff Size: Adult)   Pulse 84   Resp 16   Ht 1.549 m (5' 1\")   Wt 40.4 kg (89 lb)   SpO2 97%   BMI 16.82 kg/m²   GEN: NAD  CARDIAC: +LINDA irregular no JVP Normal S1 Preserved S2  VASCULATURE: diminished and delayed carotid " Yes pulse  RESP: Clear to auscultation bilaterally  ABD: Soft, non-tender, non-distended  EXT: No edema  NEURO: No focal deficit    History reviewed. No pertinent past medical history.  No Known Allergies  Outpatient Encounter Medications as of 6/28/2023   Medication Sig Dispense Refill    tizanidine (ZANAFLEX) 2 MG tablet TAKE 1 TABLET BY MOUTH EVERY 8 HOURS FOR 14 DAYS      Calcium Carbonate Antacid (CALCIUM CARBONATE PO) Take  by mouth.      metoprolol SR (TOPROL XL) 25 MG TABLET SR 24 HR Take 0.5 Tablets by mouth every day. 30 Tablet 1     No facility-administered encounter medications on file as of 6/28/2023.     Social History     Socioeconomic History    Marital status:      Spouse name: Not on file    Number of children: Not on file    Years of education: Not on file    Highest education level: Not on file   Occupational History    Not on file   Tobacco Use    Smoking status: Never    Smokeless tobacco: Never   Vaping Use    Vaping Use: Never used   Substance and Sexual Activity    Alcohol use: No    Drug use: No    Sexual activity: Never   Other Topics Concern    Not on file   Social History Narrative    Not on file     Social Determinants of Health     Financial Resource Strain: Not on file   Food Insecurity: Not on file   Transportation Needs: Not on file   Physical Activity: Not on file   Stress: Not on file   Social Connections: Not on file   Intimate Partner Violence: Not on file   Housing Stability: Not on file       Studies  No results found for: CHOLSTRLTOT, LDL, HDL, TRIGLYCERIDE    Lab Results   Component Value Date/Time    SODIUM 137 11/04/2022 10:51 AM    POTASSIUM 4.4 11/04/2022 10:51 AM    CHLORIDE 105 11/04/2022 10:51 AM    CO2 23 11/04/2022 10:51 AM    GLUCOSE 101 (H) 11/04/2022 10:51 AM    BUN 15 11/04/2022 10:51 AM    CREATININE 0.42 (L) 11/04/2022 10:51 AM     Lab Results   Component Value Date/Time    ALKPHOSPHAT 56 06/22/2022 02:32 AM    ASTSGOT 20 06/22/2022 02:32 AM    ALTSGPT 11  06/22/2022 02:32 AM    TBILIRUBIN 0.3 06/22/2022 02:32 AM        I directly reviewed echocardiogram images.  Findings are consistent with moderate to severe severe aortic stenosis.         Chief Complaint   Patient presents with    Aortic Stenosis     ROS:   10 point review systems is otherwise negative except as per the HPI

## 2023-06-28 NOTE — TELEPHONE ENCOUNTER
----- Message from Julieta Palacios R.N. sent at 6/27/2023 12:23 PM PDT -----  Regarding: Pre TAVR Cath  Hey! Can you please hold first available slot for pre TAVR cath? Thanks!

## 2023-07-03 ENCOUNTER — PRE-ADMISSION TESTING (OUTPATIENT)
Dept: ADMISSIONS | Facility: MEDICAL CENTER | Age: 88
End: 2023-07-03
Attending: INTERNAL MEDICINE
Payer: MEDICARE

## 2023-07-03 NOTE — PROGRESS NOTES
REFERRING PHYSICIAN: Clive Saunders MD.     CONSULTING PHYSICIAN: Francine Barahona MD     CHIEF COMPLAINT: ***Fatigue    HISTORY OF PRESENT ILLNESS: The patient is a 88 y.o. female ***  Today she states       PMH of osteoporosis with frequent falls and fractures.  Dizziness, HTN.      Found to have moderate AS on echo    PAST MEDICAL HISTORY:   Active Ambulatory Problems     Diagnosis Date Noted    Intertrochanteric fracture of femur (HCC) 06/21/2022    Osteoporosis 06/21/2022    Lightheadedness 06/21/2022    Hypertension 06/21/2022    Severe aortic stenosis 06/28/2023    Other specified hypothyroidism 06/28/2023     Resolved Ambulatory Problems     Diagnosis Date Noted    Thrombocytopenia (HCC) 06/21/2022    Leukocytosis 06/21/2022    Acute blood loss anemia 06/22/2022    Hypokalemia 06/23/2022    Anemia 06/23/2022    Elevated troponin 06/24/2022     No Additional Past Medical History       PAST SURGICAL HISTORY:   Past Surgical History:   Procedure Laterality Date    FEMUR NAILING INTRAMEDULLARY  6/22/2022    Procedure: LEFT FEMUR SHORT NAIL;  Surgeon: Ashok Santiago M.D.;  Location: SURGERY [facility];  Service: Orthopedics        ALLERGIES: No Known Allergies     CURRENT MEDICATIONS:   Current Outpatient Medications:     tizanidine (ZANAFLEX) 2 MG tablet, TAKE 1 TABLET BY MOUTH EVERY 8 HOURS FOR 14 DAYS, Disp: , Rfl:     Calcium Carbonate Antacid (CALCIUM CARBONATE PO), Take  by mouth., Disp: , Rfl:     metoprolol SR (TOPROL XL) 25 MG TABLET SR 24 HR, Take 0.5 Tablets by mouth every day., Disp: 30 Tablet, Rfl: 1    FAMILY HISTORY: No family history on file.     SOCIAL HISTORY:   Social History     Socioeconomic History    Marital status:      Spouse name: Not on file    Number of children: Not on file    Years of education: Not on file    Highest education level: Not on file   Occupational History    Not on file   Tobacco Use    Smoking status: Never    Smokeless tobacco: Never   Vaping Use     Vaping Use: Never used   Substance and Sexual Activity    Alcohol use: No    Drug use: No    Sexual activity: Never   Other Topics Concern    Not on file   Social History Narrative    Not on file     Social Determinants of Health     Financial Resource Strain: Not on file   Food Insecurity: Not on file   Transportation Needs: Not on file   Physical Activity: Not on file   Stress: Not on file   Social Connections: Not on file   Intimate Partner Violence: Not on file   Housing Stability: Not on file       REVIEW OF SYSTEMS:      PHYSICAL EXAMINATION:    There were no vitals taken for this visit.         LABS REVIEWED:  Lab Results   Component Value Date/Time    SODIUM 137 11/04/2022 10:51 AM    POTASSIUM 4.4 11/04/2022 10:51 AM    CHLORIDE 105 11/04/2022 10:51 AM    CO2 23 11/04/2022 10:51 AM    GLUCOSE 101 (H) 11/04/2022 10:51 AM    BUN 15 11/04/2022 10:51 AM    CREATININE 0.42 (L) 11/04/2022 10:51 AM      Lab Results   Component Value Date/Time    PROTHROMBTM 14.5 06/21/2022 12:45 PM    INR 1.17 (H) 06/21/2022 12:45 PM      Lab Results   Component Value Date/Time    WBC 6.8 11/04/2022 10:51 AM    RBC 4.06 (L) 11/04/2022 10:51 AM    HEMOGLOBIN 13.2 11/04/2022 10:51 AM    HEMATOCRIT 40.6 11/04/2022 10:51 AM    .0 (H) 11/04/2022 10:51 AM    MCH 32.5 11/04/2022 10:51 AM    MCHC 32.5 (L) 11/04/2022 10:51 AM    MPV 10.6 11/04/2022 10:51 AM    NEUTSPOLYS 87.60 (H) 06/22/2022 02:32 AM    LYMPHOCYTES 5.00 (L) 06/22/2022 02:32 AM    MONOCYTES 6.40 06/22/2022 02:32 AM    EOSINOPHILS 0.30 06/22/2022 02:32 AM    BASOPHILS 0.30 06/22/2022 02:32 AM        IMAGING REVIEWED AND INTERPRETED:    ECHOCARDIOGRAM 11/30/22 [facility] [place]  The left ventricular ejection fraction is visually estimated to be 45%.  Moderate aortic valve stenosis by gradients.  Transvalvular gradients are - Peak: 44 mmHg, Mean: 26 mmHg.  Aortic valve area calculated from the continuity equation is 0.9 cm2.  Mild to moderate mitral regurgitation.    CARDIAC  CATHETERIZATION ***pending    CT SCAN CHEST ***pending      IMPRESSION:  ***      PLAN:  ***    Sincerely,     Francine Barahona MD

## 2023-07-05 ENCOUNTER — HOSPITAL ENCOUNTER (OUTPATIENT)
Facility: MEDICAL CENTER | Age: 88
End: 2023-07-05
Attending: INTERNAL MEDICINE | Admitting: INTERNAL MEDICINE
Payer: MEDICARE

## 2023-07-05 ENCOUNTER — APPOINTMENT (OUTPATIENT)
Dept: CARDIOTHORACIC SURGERY | Facility: MEDICAL CENTER | Age: 88
End: 2023-07-05
Payer: MEDICARE

## 2023-07-05 ENCOUNTER — HOSPITAL ENCOUNTER (OUTPATIENT)
Dept: CARDIOLOGY | Facility: MEDICAL CENTER | Age: 88
End: 2023-07-05
Attending: INTERNAL MEDICINE
Payer: MEDICARE

## 2023-07-05 DIAGNOSIS — I35.0 SEVERE AORTIC STENOSIS: ICD-10-CM

## 2023-07-05 DIAGNOSIS — Z00.6 EXAMINATION OF PARTICIPANT IN CLINICAL TRIAL: ICD-10-CM

## 2023-07-05 DIAGNOSIS — Z01.810 PRE-PROCEDURAL CARDIOVASCULAR EXAMINATION: ICD-10-CM

## 2023-07-05 DIAGNOSIS — I35.0 NONRHEUMATIC AORTIC (VALVE) STENOSIS: ICD-10-CM

## 2023-07-05 LAB
LV EJECT FRACT  99904: 55
LV EJECT FRACT MOD 2C 99903: 50.37
LV EJECT FRACT MOD 4C 99902: 61.48
LV EJECT FRACT MOD BP 99901: 56.52

## 2023-07-05 PROCEDURE — 93306 TTE W/DOPPLER COMPLETE: CPT

## 2023-07-05 PROCEDURE — 93306 TTE W/DOPPLER COMPLETE: CPT | Mod: 26 | Performed by: INTERNAL MEDICINE

## 2023-07-05 NOTE — OR NURSING
Preadmit: Attempted to call patient and/or patient's daughter Jaimie to do preadmit for tomorrow's scheduled procedure on 7/6/23 with Dr. Wilson. Voice mail message left for Jaimie with basic instructions and request for a call back to complete preadmit.

## 2023-07-06 ENCOUNTER — HOSPITAL ENCOUNTER (OUTPATIENT)
Facility: MEDICAL CENTER | Age: 88
End: 2023-07-06
Attending: INTERNAL MEDICINE | Admitting: INTERNAL MEDICINE
Payer: MEDICARE

## 2023-07-06 ENCOUNTER — APPOINTMENT (OUTPATIENT)
Dept: CARDIOLOGY | Facility: MEDICAL CENTER | Age: 88
End: 2023-07-06
Attending: INTERNAL MEDICINE
Payer: MEDICARE

## 2023-07-06 ENCOUNTER — TELEPHONE (OUTPATIENT)
Dept: CARDIOLOGY | Facility: MEDICAL CENTER | Age: 88
End: 2023-07-06

## 2023-07-06 VITALS
DIASTOLIC BLOOD PRESSURE: 55 MMHG | BODY MASS INDEX: 16.53 KG/M2 | SYSTOLIC BLOOD PRESSURE: 119 MMHG | HEART RATE: 77 BPM | WEIGHT: 82.01 LBS | RESPIRATION RATE: 19 BRPM | TEMPERATURE: 97.9 F | OXYGEN SATURATION: 95 % | HEIGHT: 59 IN

## 2023-07-06 DIAGNOSIS — I35.0 NONRHEUMATIC AORTIC (VALVE) STENOSIS: ICD-10-CM

## 2023-07-06 DIAGNOSIS — Z01.810 PRE-PROCEDURAL CARDIOVASCULAR EXAMINATION: ICD-10-CM

## 2023-07-06 DIAGNOSIS — I35.0 SEVERE AORTIC STENOSIS: ICD-10-CM

## 2023-07-06 LAB
ALBUMIN SERPL BCP-MCNC: 4.3 G/DL (ref 3.2–4.9)
ALBUMIN/GLOB SERPL: 1.1 G/DL
ALP SERPL-CCNC: 63 U/L (ref 30–99)
ALT SERPL-CCNC: 16 U/L (ref 2–50)
ANION GAP SERPL CALC-SCNC: 11 MMOL/L (ref 7–16)
APTT PPP: 24.2 SEC (ref 24.7–36)
AST SERPL-CCNC: 19 U/L (ref 12–45)
BILIRUB SERPL-MCNC: 0.4 MG/DL (ref 0.1–1.5)
BUN SERPL-MCNC: 17 MG/DL (ref 8–22)
CALCIUM ALBUM COR SERPL-MCNC: 9.5 MG/DL (ref 8.5–10.5)
CALCIUM SERPL-MCNC: 9.7 MG/DL (ref 8.5–10.5)
CHLORIDE SERPL-SCNC: 104 MMOL/L (ref 96–112)
CO2 SERPL-SCNC: 23 MMOL/L (ref 20–33)
CREAT SERPL-MCNC: 0.53 MG/DL (ref 0.5–1.4)
EKG IMPRESSION: NORMAL
ERYTHROCYTE [DISTWIDTH] IN BLOOD BY AUTOMATED COUNT: 46.6 FL (ref 35.9–50)
GFR SERPLBLD CREATININE-BSD FMLA CKD-EPI: 89 ML/MIN/1.73 M 2
GLOBULIN SER CALC-MCNC: 3.9 G/DL (ref 1.9–3.5)
GLUCOSE SERPL-MCNC: 101 MG/DL (ref 65–99)
HCT VFR BLD AUTO: 41 % (ref 37–47)
HGB BLD-MCNC: 13.4 G/DL (ref 12–16)
INR PPP: 1.07 (ref 0.87–1.13)
MCH RBC QN AUTO: 31.8 PG (ref 27–33)
MCHC RBC AUTO-ENTMCNC: 32.7 G/DL (ref 32.2–35.5)
MCV RBC AUTO: 97.2 FL (ref 81.4–97.8)
PLATELET # BLD AUTO: 238 K/UL (ref 164–446)
PMV BLD AUTO: 10.2 FL (ref 9–12.9)
POTASSIUM SERPL-SCNC: 3.6 MMOL/L (ref 3.6–5.5)
PROT SERPL-MCNC: 8.2 G/DL (ref 6–8.2)
PROTHROMBIN TIME: 13.8 SEC (ref 12–14.6)
RBC # BLD AUTO: 4.22 M/UL (ref 4.2–5.4)
SODIUM SERPL-SCNC: 138 MMOL/L (ref 135–145)
WBC # BLD AUTO: 6.5 K/UL (ref 4.8–10.8)

## 2023-07-06 PROCEDURE — 160002 HCHG RECOVERY MINUTES (STAT)

## 2023-07-06 PROCEDURE — 85730 THROMBOPLASTIN TIME PARTIAL: CPT

## 2023-07-06 PROCEDURE — 160036 HCHG PACU - EA ADDL 30 MINS PHASE I

## 2023-07-06 PROCEDURE — 93005 ELECTROCARDIOGRAM TRACING: CPT | Performed by: INTERNAL MEDICINE

## 2023-07-06 PROCEDURE — 99152 MOD SED SAME PHYS/QHP 5/>YRS: CPT | Performed by: INTERNAL MEDICINE

## 2023-07-06 PROCEDURE — 36415 COLL VENOUS BLD VENIPUNCTURE: CPT

## 2023-07-06 PROCEDURE — 700111 HCHG RX REV CODE 636 W/ 250 OVERRIDE (IP): Performed by: NURSE PRACTITIONER

## 2023-07-06 PROCEDURE — 93010 ELECTROCARDIOGRAM REPORT: CPT | Mod: 59 | Performed by: INTERNAL MEDICINE

## 2023-07-06 PROCEDURE — A9270 NON-COVERED ITEM OR SERVICE: HCPCS

## 2023-07-06 PROCEDURE — 93458 L HRT ARTERY/VENTRICLE ANGIO: CPT | Mod: 26 | Performed by: INTERNAL MEDICINE

## 2023-07-06 PROCEDURE — 85610 PROTHROMBIN TIME: CPT

## 2023-07-06 PROCEDURE — 160047 HCHG PACU  - EA ADDL 30 MINS PHASE II

## 2023-07-06 PROCEDURE — 700102 HCHG RX REV CODE 250 W/ 637 OVERRIDE(OP)

## 2023-07-06 PROCEDURE — 160035 HCHG PACU - 1ST 60 MINS PHASE I

## 2023-07-06 PROCEDURE — 700111 HCHG RX REV CODE 636 W/ 250 OVERRIDE (IP): Mod: JZ

## 2023-07-06 PROCEDURE — 80053 COMPREHEN METABOLIC PANEL: CPT

## 2023-07-06 PROCEDURE — 99153 MOD SED SAME PHYS/QHP EA: CPT

## 2023-07-06 PROCEDURE — 85027 COMPLETE CBC AUTOMATED: CPT

## 2023-07-06 PROCEDURE — 160046 HCHG PACU - 1ST 60 MINS PHASE II

## 2023-07-06 PROCEDURE — 700101 HCHG RX REV CODE 250

## 2023-07-06 PROCEDURE — 700117 HCHG RX CONTRAST REV CODE 255: Performed by: INTERNAL MEDICINE

## 2023-07-06 RX ORDER — PHENYLEPHRINE HCL IN 0.9% NACL 0.5 MG/5ML
SYRINGE (ML) INTRAVENOUS
Status: COMPLETED
Start: 2023-07-06 | End: 2023-07-06

## 2023-07-06 RX ORDER — SODIUM CHLORIDE 9 MG/ML
INJECTION, SOLUTION INTRAVENOUS CONTINUOUS
Status: DISCONTINUED | OUTPATIENT
Start: 2023-07-06 | End: 2023-07-06 | Stop reason: HOSPADM

## 2023-07-06 RX ORDER — HEPARIN SODIUM 1000 [USP'U]/ML
INJECTION, SOLUTION INTRAVENOUS; SUBCUTANEOUS
Status: COMPLETED
Start: 2023-07-06 | End: 2023-07-06

## 2023-07-06 RX ORDER — HEPARIN SODIUM 200 [USP'U]/100ML
INJECTION, SOLUTION INTRAVENOUS
Status: COMPLETED
Start: 2023-07-06 | End: 2023-07-06

## 2023-07-06 RX ORDER — VERAPAMIL HYDROCHLORIDE 2.5 MG/ML
INJECTION, SOLUTION INTRAVENOUS
Status: COMPLETED
Start: 2023-07-06 | End: 2023-07-06

## 2023-07-06 RX ORDER — HYDRALAZINE HYDROCHLORIDE 20 MG/ML
10 INJECTION INTRAMUSCULAR; INTRAVENOUS ONCE
Status: COMPLETED | OUTPATIENT
Start: 2023-07-06 | End: 2023-07-06

## 2023-07-06 RX ORDER — MIDAZOLAM HYDROCHLORIDE 1 MG/ML
INJECTION INTRAMUSCULAR; INTRAVENOUS
Status: COMPLETED
Start: 2023-07-06 | End: 2023-07-06

## 2023-07-06 RX ORDER — ASPIRIN 81 MG/1
TABLET, CHEWABLE ORAL
Status: COMPLETED
Start: 2023-07-06 | End: 2023-07-06

## 2023-07-06 RX ORDER — LIDOCAINE HYDROCHLORIDE 20 MG/ML
INJECTION, SOLUTION INFILTRATION; PERINEURAL
Status: COMPLETED
Start: 2023-07-06 | End: 2023-07-06

## 2023-07-06 RX ADMIN — MIDAZOLAM 2 MG: 1 INJECTION, SOLUTION INTRAMUSCULAR; INTRAVENOUS at 09:27

## 2023-07-06 RX ADMIN — FENTANYL CITRATE 100 MCG: 50 INJECTION, SOLUTION INTRAMUSCULAR; INTRAVENOUS at 09:35

## 2023-07-06 RX ADMIN — NITROGLYCERIN 10 ML: 20 INJECTION INTRAVENOUS at 08:38

## 2023-07-06 RX ADMIN — HEPARIN SODIUM: 1000 INJECTION, SOLUTION INTRAVENOUS; SUBCUTANEOUS at 08:38

## 2023-07-06 RX ADMIN — HEPARIN SODIUM 2000 UNITS: 200 INJECTION, SOLUTION INTRAVENOUS at 09:16

## 2023-07-06 RX ADMIN — VERAPAMIL HYDROCHLORIDE 2.5 MG: 2.5 INJECTION, SOLUTION INTRAVENOUS at 08:38

## 2023-07-06 RX ADMIN — HYDRALAZINE HYDROCHLORIDE 10 MG: 20 INJECTION, SOLUTION INTRAMUSCULAR; INTRAVENOUS at 07:55

## 2023-07-06 RX ADMIN — ASPIRIN 81 MG 324 MG: 81 TABLET ORAL at 08:57

## 2023-07-06 RX ADMIN — IOHEXOL 14 ML: 350 INJECTION, SOLUTION INTRAVENOUS at 09:37

## 2023-07-06 RX ADMIN — HEPARIN SODIUM 2000 UNITS: 200 INJECTION, SOLUTION INTRAVENOUS at 08:39

## 2023-07-06 RX ADMIN — LIDOCAINE HYDROCHLORIDE: 20 INJECTION, SOLUTION INFILTRATION; PERINEURAL at 08:38

## 2023-07-06 ASSESSMENT — FIBROSIS 4 INDEX: FIB4 SCORE: 2.09

## 2023-07-06 NOTE — OR NURSING
TR band removed from R wrist per MD order. No issues, site soft. Gauze/tegaderm placed over incision site. Warm RUE, less than 3 second capillary refill to R fingers. Pt denies pain and nausea. Tolerated sips of water.   VSS. Pt on room air. Afebrile. A&OX4.   Report given to TALON Deng. Pt to Phase II via sharath.   Assisted to recliner. Handoff to TALON West.

## 2023-07-06 NOTE — PROCEDURES
Cardiac Catheterization report    7/6/2023  10:04 AM    Referring MD: Dr. Saunders    Indication/Preoperative diagnosis:  Extreme frailty  Elderly  Moderate aortic stenosis  Uncontrolled hypertension,  mmHg preprocedurally    Postoperative diagnosis:  Moderate aortic stenosis, mean aortic valve gradient 33 mmHg   LCx, otherwise trivial CAD  Inability to cannulate RCA    Recommendations:  Guideline directed medical therapy   Cardiovascular Risk factor modification  Recommend consideration of end-of-life care goals as her frailty and lack of cardiovascular symptoms in my opinion precludes further invasive or aggressive cardiac procedures, particularly as she tolerated a simple diagnostic procedure so poorly.      Procedures performed:  Coronary arteriograms  Left heart catheterization   Supervision moderate sedation      FINDINGS:  I.  HEMODYNAMICS   Ao: 180/110 mmHg   LVEDP: 16 mmHg   Gradient on LV pullback: Yes, 33 mmHg    II. CORONARY ANGIOGRAPHY:  Left main coronary artery: Large caliber bifurcating no CAD  Left anterior descending artery: Moderate caliber tortuous  Trivial nonobstructive CAD  Left circumflex coronary artery: Moderate caliber supplying a large obtuse marginal.  Becomes chronic totally occluded after the takeoff of this branch.  Right coronary artery: Inability to cannulate due to patient frailty and inability to tolerate the procedure further.      Procedure details:  The risks and benefits of cardiac catheterization and possible intervention were explained to the patient including death, heart attack, stroke, and emergency surgery.  The patient verbalized understanding and wished to proceed.  The patient was brought to the cardiac catheterization laboratory in the fasting state and prepped and draped in the usual sterile fashion.  The right wrist was locally anesthetized with lidocaine and the right radial artery was cannulated with 5/6-Polish equipment and standard radial cocktail was  given.  The radial/brachial juncture required navigation with an 014 wire.  Ongoing spasm throughout the case due to patient frailty and slight size limited the study.  The Sohail was used to cross the aortic valve with a J-tipped wire.  Over an exchange wire the [place] dual-lumen catheter was advanced into the LV with great difficulty.  Simultaneous LV AO pressures were recorded after meticulous flushing and zeroing.  Subsequently 4 Malagasy catheters were used to complete coronary angiography.  A 4 Malagasy JR4 was then no longer able to be passed for cannulation of the RCA.  Patient was not tolerating the procedure well due to intermittent hemodynamic instability, ongoing radial artery spasm despite deep sedation and intra vascular vasodilators.  At this point the procedure was abandoned and all catheters and guidewires were removed.  A TR-Band was placed without difficulty to achieve patent hemostasis.  Patient tolerated procedure poorly however left the Cath Lab in stable condition.    Complications:  None apparent    Sedation time:  Moderate sedation directly monitored by me during the case while supervising the administration of the sedation medication by an independent trained RN to assist in the monitoring of the patient's level of consciousness and physiological status. I, the supervising physician was present the entire time from beginning of medication administration until the end of the procedure from 8:56 AM until 9:28 AM. For detailed administration records please see the moderate sedation documentation in the media tab.    Following the procedure I discussed the results with the patient and the patients designated contact/family member  .    Duane Bass MD, FACC, [facility]

## 2023-07-06 NOTE — OR NURSING
BP elevated at 243/107. Jessica MCLEAN Notified. Orders received to administer Hydralazine 10 mg IV once.

## 2023-07-06 NOTE — DISCHARGE INSTRUCTIONS
HOME CARE INSTRUCTIONS    ACTIVITY: Rest and take it easy for the first 24 hours.  A responsible adult is recommended to remain with you during that time.  It is normal to feel sleepy.  We encourage you to not do anything that requires balance, judgment or coordination.    FOR 24 HOURS DO NOT:  Drive, operate machinery or run household appliances.  Drink beer or alcoholic beverages.  Make important decisions or sign legal documents.    SPECIAL INSTRUCTIONS:   POST ANGIOGRAM  General Care Instructions  Maintain a bandage over the incision site for 24 hours.  It's normal to find a small bruise or dime-sized lump at the insertion site. This should disappear within a few weeks.  Do not apply lotions or powders to the site.  Do not immerse the catheter insertion site in water (bathtub/swimming) for five days. It is ok to shower 24 hours after the procedure.  You may resume your normal diet immediately; on the day of your procedure, drink 6-10 glasses of water to help flush the contrast liquid out of your system.  If the doctor inserted the catheter in through your groin:  Walking short distances on a flat surface is OK. Limit going up/down stairs for the first 2 days.  DO NOT do yard work, drive, squat, lift heavy objects, or play sports for 2 days; or until your health care provider tells you it is OK.  If the doctor inserted the catheter in your arm:  For 24 hours, DO NOT lift anything heavier than 10 pounds (approximately a gallon of milk). DO NOT do any heavy pushing, pulling, or twisting.    Medications  If your current medications need to be changed, you will be provided with an updated list of your medications prior to discharge.  If you take warfarin (Coumadin), resume taking your usual dose the evening after the procedure.  DO NOT STOP taking prescribed blood thinning (anti-platelet) medications unless instructed by your cardiologist.  These medications include:  Aspirin, Clopidogrel (Plavix), Ticagrelor  (Brilinta), or Prasugrel (Effient)   If you take one of the following anticoagulants, RESUME 24 HOURS after your procedure:  Apixiban (Eliquis), Rivaroxaban (Xarelto), Dabigatran (Pradaxa), Edoxaban (Savaysa)  If you take metformin (Glucophage), RESUME 48 HOURS after your procedure.    When to call your healthcare provider  Call your cardiologist right away at [phone] if you have any of the following:   Problems/Concerns taking any of your prescribed heart medicines.   The insertion site has increasing pain, swelling, redness, bleeding, or drainage.   Your arm or leg below where the insertion site changes color, is cool, or is numb.   You have chest pain or shortness of breath that does not go away with rest.   Your pulse feels irregular -- very slow (less than 60 beats/minute) or very fast (over 100 beats/minute).   You have dizziness, fainting, or you are very tired.   You are coughing up blood or yellow or green mucus.   You have chills or a fever over 101°F (38.3°C).    If there is bleeding at the catheter insertion site, apply pressure for 10 minutes.  If bleeding persists, call 911, and continue to hold pressure until advanced medical support arrives.    DIET: To avoid nausea, slowly advance diet as tolerated, avoiding spicy or greasy foods for the first day.  Add more substantial food to your diet according to your physician's instructions.  Babies can be fed formula or breast milk as soon as they are hungry.  INCREASE FLUIDS AND FIBER TO AVOID CONSTIPATION.    SURGICAL DRESSING/BATHING: Keep dressing on for 24 hours do not submerge in water until cleared by your provider.     MEDICATIONS: Resume taking daily medication.  Take prescribed pain medication with food.  If no medication is prescribed, you may take non-aspirin pain medication if needed.  PAIN MEDICATION CAN BE VERY CONSTIPATING.  Take a stool softener or laxative such as senokot, pericolace, or milk of magnesia if needed.    A follow-up  appointment should be arranged with your doctor in 1-2 weeks; call to schedule.    You should CALL YOUR PHYSICIAN if you develop:  Fever greater than 101 degrees F.  Pain not relieved by medication, or persistent nausea or vomiting.  Excessive bleeding (blood soaking through dressing) or unexpected drainage from the wound.  Extreme redness or swelling around the incision site, drainage of pus or foul smelling drainage.  Inability to urinate or empty your bladder within 8 hours.  Problems with breathing or chest pain.    You should call 911 if you develop problems with breathing or chest pain.  If you are unable to contact your doctor or surgical center, you should go to the nearest emergency room or urgent care center.  Physician's telephone #: [phone]    MILD FLU-LIKE SYMPTOMS ARE NORMAL.  YOU MAY EXPERIENCE GENERALIZED MUSCLE ACHES, THROAT IRRITATION, HEADACHE AND/OR SOME NAUSEA.    If any questions arise, call your doctor.  If your doctor is not available, please feel free to call the Surgical Center at [phone].  The Center is open Monday through Friday from 7AM to 7PM.      A registered nurse may call you a few days after your surgery to see how you are doing after your procedure.    You may also receive a survey in the mail within the next two weeks and we ask that you take a few moments to complete the survey and return it to us.  Our goal is to provide you with very good care and we value your comments.     Depression / Suicide Risk    As you are discharged from this [facility] facility, it is important to learn how to keep safe from harming yourself.    Recognize the warning signs:  Abrupt changes in personality, positive or negative- including increase in energy   Giving away possessions  Change in eating patterns- significant weight changes-  positive or negative  Change in sleeping patterns- unable to sleep or sleeping all the time   Unwillingness or inability to  communicate  Depression  Unusual sadness, discouragement and loneliness  Talk of wanting to die  Neglect of personal appearance   Rebelliousness- reckless behavior  Withdrawal from people/activities they love  Confusion- inability to concentrate     If you or a loved one observes any of these behaviors or has concerns about self-harm, here's what you can do:  Talk about it- your feelings and reasons for harming yourself  Remove any means that you might use to hurt yourself (examples: pills, rope, extension cords, firearm)  Get professional help from the community (Mental Health, Substance Abuse, psychological counseling)  Do not be alone:Call your Safe Contact- someone whom you trust who will be there for you.  Call your local CRISIS HOTLINE 481-2347 or 284-763-7219  Call your local Children's Mobile Crisis Response Team [place] (123) 219-1507 or www.[employer]  Call the toll free National Suicide Prevention Hotlines   National Suicide Prevention Lifeline 493-097-QMCO (2665)  National Hope Line Network 800-SUICIDE (131-2918)    I acknowledge receipt and understanding of these Home Care instructions.

## 2023-07-06 NOTE — OR NURSING
Pt's VSS; denies N/V; denies pain. Dressing CDI to R radial site. D/c orders received. IV dc'd. Pt changed into clothing with assistance. Pt up and ambulated to BR, steady gait, voided adequately. Discharge instructions given as well as pain management handout; pt and family verbalized understanding and questions answered. Patient states ready to d/c home. No prescriptions given. Pt dc'd in w/c with RN in stable condition.

## 2023-07-06 NOTE — TELEPHONE ENCOUNTER
Received message from patient's granddaughter, Rebecca, requesting call back to discuss upcoming TAVR.    Called and spoke to Rebecca. All questions regarding TAVR answered. Discussed echo and angiogram results. Advised that TAVR would be postponed at this time and explanation provided. Rebecca verbalizes understanding.     Called and spoke to patient's daughter, Jaimie. Discussed echo and angiogram results. Advised that TAVR would be postponed at this time and explanation provided. Jaimie verbalizes understanding.    4 month follow up visit with Dr. Saunders scheduled.

## 2023-07-09 DIAGNOSIS — M81.0 OSTEOPOROSIS, UNSPECIFIED OSTEOPOROSIS TYPE, UNSPECIFIED PATHOLOGICAL FRACTURE PRESENCE: ICD-10-CM

## 2023-07-10 ENCOUNTER — OFFICE VISIT (OUTPATIENT)
Dept: INTERNAL MEDICINE | Facility: OTHER | Age: 88
End: 2023-07-10
Payer: MEDICARE

## 2023-07-10 DIAGNOSIS — Z86.39 HISTORY OF VITAMIN D DEFICIENCY: ICD-10-CM

## 2023-07-10 DIAGNOSIS — E04.2 GOITER, NONTOXIC, MULTINODULAR: ICD-10-CM

## 2023-07-10 DIAGNOSIS — E03.8 OTHER SPECIFIED HYPOTHYROIDISM: ICD-10-CM

## 2023-07-10 PROCEDURE — 3075F SYST BP GE 130 - 139MM HG: CPT | Performed by: STUDENT IN AN ORGANIZED HEALTH CARE EDUCATION/TRAINING PROGRAM

## 2023-07-10 PROCEDURE — 99213 OFFICE O/P EST LOW 20 MIN: CPT | Mod: GC | Performed by: STUDENT IN AN ORGANIZED HEALTH CARE EDUCATION/TRAINING PROGRAM

## 2023-07-10 PROCEDURE — 3078F DIAST BP <80 MM HG: CPT | Performed by: STUDENT IN AN ORGANIZED HEALTH CARE EDUCATION/TRAINING PROGRAM

## 2023-07-10 ASSESSMENT — FIBROSIS 4 INDEX: FIB4 SCORE: 1.76

## 2023-07-10 NOTE — PATIENT INSTRUCTIONS
Thank you for visiting today!  Please follow-up in 2 months   Call [phone] for the thyroid ultrasound.   Please get lab work done at least 5 days before next visit.  Please try and eat healthy, get at least 30 minutes of cardiovascular exercise a day to help keep your health as best as it can be.  If you have any questions or concerns please feel free to contact us at [phone].  If you feel like you are experiencing a medical emergency please seek immediate medical attention at an urgent care or in the emergency department.

## 2023-07-12 VITALS
WEIGHT: 83.6 LBS | HEIGHT: 55 IN | HEART RATE: 74 BPM | TEMPERATURE: 98.1 F | BODY MASS INDEX: 19.35 KG/M2 | OXYGEN SATURATION: 95 % | SYSTOLIC BLOOD PRESSURE: 130 MMHG | DIASTOLIC BLOOD PRESSURE: 68 MMHG

## 2023-07-13 NOTE — PROGRESS NOTES
Established Patient    Patient Care Team:  Marcelino Obrien M.D. as PCP - General (Internal Medicine)    Leslie Friend is a 88 y.o. female who presents today with the following Chief Complaint(s): Follow up for Diagnoses of Other specified hypothyroidism, Goiter, nontoxic, multinodular, and History of vitamin D deficiency were pertinent to this visit.    HPI:  Ms. Jefe santamaria a is a very pleasant 88-year-old female with past medical history significant for moderate aortic stenosis, osteoporosis, hypertension, and hypothyroidism who presents today for routine follow-up.  Patient has been following with cardiology however was lost to follow-up with myself for quite some time.  Patient with recent catheterization demonstrating more likely moderate thin severe aortic stenosis, per patient and daughter no plan for surgery at this time patient is doing well he is gaining weight which is a plus on Ensure shakes.  Patient otherwise seems to be doing well with blood pressure well controlled on metoprolol at home in the 130s to 140s which is reasonable given aortic stenosis, no headaches lightheadedness or dizziness.  No polydipsia, or polyphagia.  No recent falls, uses walker to get around the house, denies any syncope or presyncopal symptoms.  14 point review of systems negative except for as above and below.      ROS:     General: No fevers, chills, night sweats, weight loss or gain  HEENT: No hearing changes, vision changes, eye pain, ear pain, nasal discharge, sore throat  Neck: No swelling in neck  Pulmonary: No shortness of breath, cough, sputum, or hemoptysis  Cardiovascular: No chest pain, palpitations, or LE swelling  GI: No nausea, vomiting, diarrhea, constipation, abdominal pain, hematochezia or melena  : No dysuria or frequency  Neuro: No focal weakness, no general weakness, no headaches, no lightheadedness, no dizziness  Psych: No anxiety or depression    Past Medical History:   Diagnosis Date  "   Anesthesia     needed additonal anesthesia and then was slow to wake up    Disorder of thyroid     being worked up for thyroid issues    Heart valve disease     Hypertension      Social History     Tobacco Use    Smoking status: Never    Smokeless tobacco: Never   Vaping Use    Vaping Use: Never used   Substance Use Topics    Alcohol use: No    Drug use: No     Current Outpatient Medications   Medication Sig Dispense Refill    tizanidine (ZANAFLEX) 2 MG tablet TAKE 1 TABLET BY MOUTH EVERY 8 HOURS FOR 14 DAYS      Calcium Carbonate Antacid (CALCIUM CARBONATE PO) Take  by mouth.      metoprolol SR (TOPROL XL) 25 MG TABLET SR 24 HR Take 0.5 Tablets by mouth every day. 30 Tablet 1     No current facility-administered medications for this visit.       Physical Exam:  /68 (BP Location: Left arm, Patient Position: Sitting, BP Cuff Size: Small adult)   Pulse 74   Temp 36.7 °C (98.1 °F) (Temporal)   Ht 1.397 m (4' 7\")   Wt 37.9 kg (83 lb 9.6 oz)   SpO2 95%   BMI 19.43 kg/m²   General: Well developed, well nourished female, in no distress.  HEENT: NC/AT, PERRL, EOMI, no scleral icterus or conjunctival pallor, fair dentition/denture in, no nasal discharge or oral erythema or exudates.   Neck: Supple, No cervical or supraclavicular LAD  CV:RRR, 2 out of 6 systolic ejection murmur best heard at right upper sternal border, no JVD  Pulm: LCAB, no crackles, rales, rhonchi, or wheezing  GI: Normal bowel sounds, abdomen soft, nontender, nondistended to deep or light palpation in all 4 quadrants  MSK: Radial and dorsalis pedis pulses 2+ and equal bilaterally, respectively.  Strength 5 out of 5 in upper and lower extremities.  No lower extremity edema  Neuro: Patient is alert and oriented x3, no focal deficits  Psych: Appropriate mood and affect       Assessment and Plan:   1. Other specified hypothyroidism  2. Goiter, nontoxic, multinodular  Clinically euthyroid patient is doing well, previous history of incidental " finding of multi nodule thyroid with normal free T4, given lack of symptoms shared decision making was had, and severe aortic stenosis at the time, patient did not wish for further work-up however now that aortic stenosis is more moderate patient has a better quality of living, will work-up with ultrasound of the neck for further characterization of goiter as well as repeat TSH and free T4.  -Denies any red flag signs or symptoms  - Counseled on strict return precautions  - TSH+FREE T4  - US-SOFT TISSUES OF HEAD - NECK; Future    3. History of vitamin D deficiency  History of vitamin D deficiency, will monitor for repletion given osteoporosis.  - VITAMIN D,25 HYDROXY (DEFICIENCY); Future           Return in about 2 months (around 9/10/2023).    Patient Instructions   Thank you for visiting today!  Please follow-up in 2 months   Call [phone] for the thyroid ultrasound.   Please get lab work done at least 5 days before next visit.  Please try and eat healthy, get at least 30 minutes of cardiovascular exercise a day to help keep your health as best as it can be.  If you have any questions or concerns please feel free to contact us at [phone].  If you feel like you are experiencing a medical emergency please seek immediate medical attention at an urgent care or in the emergency department.       Marcelino Obrien M.D. PGY 3  [facility]    This note was created using voice recognition software.  While every attempt is made to ensure accuracy of transcription, occasionally errors occur.

## 2023-07-24 RX ORDER — ZOLEDRONIC ACID 5 MG/100ML
5 INJECTION, SOLUTION INTRAVENOUS ONCE
OUTPATIENT
Start: 2023-08-11 | End: 2023-08-11

## 2023-09-01 ENCOUNTER — HOSPITAL ENCOUNTER (OUTPATIENT)
Dept: RADIOLOGY | Facility: MEDICAL CENTER | Age: 88
End: 2023-09-01
Attending: STUDENT IN AN ORGANIZED HEALTH CARE EDUCATION/TRAINING PROGRAM
Payer: MEDICARE

## 2023-09-01 DIAGNOSIS — E04.2 GOITER, NONTOXIC, MULTINODULAR: ICD-10-CM

## 2023-09-01 PROCEDURE — 76536 US EXAM OF HEAD AND NECK: CPT

## 2023-09-13 DIAGNOSIS — E05.20 TOXIC MULTINODULAR GOITER W/O CRISIS: ICD-10-CM

## 2023-09-13 NOTE — PROGRESS NOTES
Ultrasound of the thyroid demonstrating 3 TI-RADS 4 nodules, will repeat TSH and free T4 as previously planned prior to her next visit later this month however will send out endocrinology referral now for further evaluation of nodules and to rule out malignancy.

## 2023-09-25 ENCOUNTER — TELEPHONE (OUTPATIENT)
Dept: INTERNAL MEDICINE | Facility: OTHER | Age: 88
End: 2023-09-25

## 2023-09-25 ENCOUNTER — OFFICE VISIT (OUTPATIENT)
Dept: INTERNAL MEDICINE | Facility: OTHER | Age: 88
End: 2023-09-25
Payer: MEDICARE

## 2023-09-25 VITALS
HEART RATE: 61 BPM | TEMPERATURE: 97.3 F | HEIGHT: 55 IN | OXYGEN SATURATION: 95 % | DIASTOLIC BLOOD PRESSURE: 90 MMHG | WEIGHT: 79.6 LBS | BODY MASS INDEX: 18.42 KG/M2 | SYSTOLIC BLOOD PRESSURE: 120 MMHG

## 2023-09-25 DIAGNOSIS — I10 HYPERTENSION, UNSPECIFIED TYPE: ICD-10-CM

## 2023-09-25 DIAGNOSIS — E04.2 GOITER, NONTOXIC, MULTINODULAR: ICD-10-CM

## 2023-09-25 DIAGNOSIS — Z23 FLU VACCINE NEED: ICD-10-CM

## 2023-09-25 DIAGNOSIS — M80.00XD AGE-RELATED OSTEOPOROSIS WITH CURRENT PATHOLOGICAL FRACTURE WITH ROUTINE HEALING: ICD-10-CM

## 2023-09-25 PROCEDURE — 90662 IIV NO PRSV INCREASED AG IM: CPT | Performed by: STUDENT IN AN ORGANIZED HEALTH CARE EDUCATION/TRAINING PROGRAM

## 2023-09-25 PROCEDURE — 99213 OFFICE O/P EST LOW 20 MIN: CPT | Mod: 25,GE | Performed by: STUDENT IN AN ORGANIZED HEALTH CARE EDUCATION/TRAINING PROGRAM

## 2023-09-25 PROCEDURE — G0008 ADMIN INFLUENZA VIRUS VAC: HCPCS | Performed by: STUDENT IN AN ORGANIZED HEALTH CARE EDUCATION/TRAINING PROGRAM

## 2023-09-25 PROCEDURE — 3080F DIAST BP >= 90 MM HG: CPT | Performed by: STUDENT IN AN ORGANIZED HEALTH CARE EDUCATION/TRAINING PROGRAM

## 2023-09-25 PROCEDURE — 3074F SYST BP LT 130 MM HG: CPT | Performed by: STUDENT IN AN ORGANIZED HEALTH CARE EDUCATION/TRAINING PROGRAM

## 2023-09-25 ASSESSMENT — FIBROSIS 4 INDEX: FIB4 SCORE: 1.76

## 2023-09-25 NOTE — PATIENT INSTRUCTIONS
Thank you for visiting today!  Please follow-up in 2 months for your annual wellness visit.   Please follow-up on referrals and schedule an appointment with [facility] for your thyroid.  Please call [phone] to make an appointment.  I hope you enjoy your trip to [place], please get lab work done at least 5 days before your next appointment, we will see you for your Medicare annual wellness visit.  Please get lab work done at least 5 days before next visit.  Please try and eat healthy, get at least 30 minutes of cardiovascular exercise a day to help keep your health as best as it can be.  If you have any questions or concerns please feel free to contact us at [phone].  If you feel like you are experiencing a medical emergency please seek immediate medical attention at an urgent care or in the emergency department.

## 2023-09-25 NOTE — PROGRESS NOTES
Established Patient    Patient Care Team:  Marcelino Obrien M.D. as PCP - General (Internal Medicine)    Leslie Friend is a 88 y.o. female who presents today with the following Chief Complaint(s): Follow up for Diagnoses of Goiter, nontoxic, multinodular and Hypertension, unspecified type were pertinent to this visit.    HPI:  Ms. Edwards is a very pleasant 88-year-old female with past medical history significant for moderate aortic stenosis, osteoporosis, hypertension, and hypothyroidism who presents today for follow up for thyroid nodules, patient with goiter seen on previous CT scan, was very ill with aortic stenosis at that time, now improving, recent ultrasound with referral sent after result to endocrinology, patient has been doing well, weight has been stable for, still trying to eat more food, patient denies any heat or cold intolerance denies any polydipsia or polyphagia.  Patient denies any palpitations.  Patient herself without any acute complaints, however she is concerned about the health of her  who has poorly controlled type 2 diabetes however likely he is seeing a physician later today, patient plans to go on to [place] over the next few weeks but her and her daughter will schedule an appointment with endocrinology, discussed likely need for fine-needle aspiration biopsy thyroid nodules.        ROS:     General: No fevers, chills, night sweats, weight loss or gain  HEENT: No hearing changes, vision changes, eye pain, ear pain, nasal discharge, sore throat  Neck: No swelling in neck  Pulmonary: No shortness of breath, cough, sputum, or hemoptysis  Cardiovascular: No chest pain, palpitations, or LE swelling  GI: No nausea, vomiting, diarrhea, constipation, abdominal pain, hematochezia or melena  : No dysuria or frequency  Neuro: No focal weakness, no general weakness, no  "headaches, no lightheadedness, no dizziness  Psych: No anxiety or depression    Past Medical History:   Diagnosis Date    Anesthesia     needed additonal anesthesia and then was slow to wake up    Disorder of thyroid     being worked up for thyroid issues    Heart valve disease     Hypertension      Social History     Tobacco Use    Smoking status: Never    Smokeless tobacco: Never   Vaping Use    Vaping Use: Never used   Substance Use Topics    Alcohol use: No    Drug use: No     Current Outpatient Medications   Medication Sig Dispense Refill    tizanidine (ZANAFLEX) 2 MG tablet TAKE 1 TABLET BY MOUTH EVERY 8 HOURS FOR 14 DAYS      Calcium Carbonate Antacid (CALCIUM CARBONATE PO) Take  by mouth.      metoprolol SR (TOPROL XL) 25 MG TABLET SR 24 HR Take 0.5 Tablets by mouth every day. 30 Tablet 1     No current facility-administered medications for this visit.       Physical Exam:  BP (!) 120/90 (BP Location: Right arm, Patient Position: Sitting, BP Cuff Size: Small adult)   Pulse 61   Temp 36.3 °C (97.3 °F) (Temporal)   Ht 1.397 m (4' 7\")   Wt 36.1 kg (79 lb 9.6 oz)   SpO2 95%   BMI 18.50 kg/m²   General: Well developed, thin female, in no distress.  HEENT: NC/AT, PERRL, EOMI, no scleral icterus or conjunctival pallor, fair dentition/denture in, no nasal discharge or oral erythema or exudates.   Neck: Supple, No cervical or supraclavicular LAD  CV:RRR, 2/6 SEJM, no JVD  Pulm: LCAB, no crackles, rales, rhonchi, or wheezing  GI: Normal bowel sounds, abdomen soft, nontender, nondistended to deep or light palpation in all 4 quadrants, no HSM.  MSK: Radial and dorsalis pedis pulses 2+ and equal bilaterally, respectively.  Strength 5 out of 5 in upper and lower extremities.  No lower extremity edema  Neuro: Patient is alert and oriented x3, no focal deficits  Psych: Appropriate mood and affect       Assessment and Plan:   1. Goiter, nontoxic, multinodular  Ultrasound of the thyroid demonstrating 3 TI-RADS 4 " nodules, will repeat TSH and free T4 as previously planned prior to her next visit later this month however will send out endocrinology referral now for further evaluation of nodules and to rule out malignancy.  -Refferal to endocrinology, patient to follow up for likely FNA   -repeat TSH and T4  -No B symptoms    2. Hypertension, unspecified type  -well controled, blood pressure 120/90 given moderate aortic stenosis and age at goal, no ckd, asymptomatic, continue to keep home blood pressure log   - continue metoprolol              Return in about 2 months (around 11/25/2023) for annual [facility] visit .      Marcelino Obrien M.D. PGY 3  [facility]    This note was created using voice recognition software.  While every attempt is made to ensure accuracy of transcription, occasionally errors occur.

## 2023-09-25 NOTE — TELEPHONE ENCOUNTER
Phone Number Called: [phone]    Call outcome:  Spoke w/ Danilo from [facility]    Message: Pt and daughter were in clinic today no labs were in chart from 7/10 orders advised pt's daughter she says they were done, called [facility] they advised that US of thyroid was done but no labs were collected. Provider was made aware

## 2023-09-26 PROBLEM — R42 LIGHTHEADEDNESS: Status: RESOLVED | Noted: 2022-06-21 | Resolved: 2023-09-26

## 2023-11-07 ENCOUNTER — APPOINTMENT (OUTPATIENT)
Dept: CARDIOLOGY | Facility: MEDICAL CENTER | Age: 88
End: 2023-11-07
Attending: INTERNAL MEDICINE
Payer: MEDICARE

## 2023-11-13 ENCOUNTER — APPOINTMENT (OUTPATIENT)
Dept: INTERNAL MEDICINE | Facility: OTHER | Age: 88
End: 2023-11-13
Payer: MEDICARE

## 2024-08-21 RX ORDER — ZOLEDRONIC ACID 5 MG/100ML
5 INJECTION, SOLUTION INTRAVENOUS ONCE
OUTPATIENT
Start: 2024-08-21 | End: 2024-08-21

## 2025-02-25 ENCOUNTER — OFFICE VISIT (OUTPATIENT)
Dept: INTERNAL MEDICINE | Facility: OTHER | Age: OVER 89
End: 2025-02-25
Payer: MEDICARE

## 2025-02-25 VITALS
HEIGHT: 57 IN | HEART RATE: 72 BPM | SYSTOLIC BLOOD PRESSURE: 196 MMHG | TEMPERATURE: 97.7 F | DIASTOLIC BLOOD PRESSURE: 69 MMHG | WEIGHT: 78.4 LBS | OXYGEN SATURATION: 96 % | BODY MASS INDEX: 16.91 KG/M2

## 2025-02-25 DIAGNOSIS — M81.0 OSTEOPOROSIS, UNSPECIFIED OSTEOPOROSIS TYPE, UNSPECIFIED PATHOLOGICAL FRACTURE PRESENCE: ICD-10-CM

## 2025-02-25 DIAGNOSIS — I10 HYPERTENSION, UNSPECIFIED TYPE: ICD-10-CM

## 2025-02-25 DIAGNOSIS — E04.2 GOITER, NONTOXIC, MULTINODULAR: ICD-10-CM

## 2025-02-25 DIAGNOSIS — Z76.89 ENCOUNTER TO ESTABLISH CARE: ICD-10-CM

## 2025-02-25 DIAGNOSIS — M80.00XD AGE-RELATED OSTEOPOROSIS WITH CURRENT PATHOLOGICAL FRACTURE WITH ROUTINE HEALING: ICD-10-CM

## 2025-02-25 DIAGNOSIS — M54.50 ACUTE MIDLINE LOW BACK PAIN WITHOUT SCIATICA: ICD-10-CM

## 2025-02-25 DIAGNOSIS — I35.0 NONRHEUMATIC AORTIC VALVE STENOSIS: ICD-10-CM

## 2025-02-25 DIAGNOSIS — Z23 ENCOUNTER FOR IMMUNIZATION: ICD-10-CM

## 2025-02-25 RX ORDER — LIDOCAINE 4 G/G
1 PATCH TOPICAL EVERY 24 HOURS
Qty: 10 PATCH | Refills: 0 | Status: SHIPPED | OUTPATIENT
Start: 2025-02-25

## 2025-02-25 RX ORDER — CYCLOBENZAPRINE HCL 5 MG
5-10 TABLET ORAL NIGHTLY
COMMUNITY
Start: 2025-02-20

## 2025-02-25 RX ORDER — AMLODIPINE BESYLATE 5 MG/1
5 TABLET ORAL DAILY
Qty: 100 TABLET | Refills: 3 | Status: SHIPPED | OUTPATIENT
Start: 2025-02-25 | End: 2026-04-01

## 2025-02-25 RX ORDER — CALCIUM CARBONATE/VITAMIN D3 500MG-5MCG
1 TABLET ORAL 2 TIMES DAILY
Qty: 200 TABLET | Refills: 0 | Status: SHIPPED | OUTPATIENT
Start: 2025-02-25 | End: 2025-06-05

## 2025-02-25 RX ORDER — ACETAMINOPHEN 500 MG
500 TABLET ORAL EVERY 6 HOURS PRN
Qty: 30 TABLET | Refills: 0 | Status: SHIPPED | OUTPATIENT
Start: 2025-02-25 | End: 2025-03-07

## 2025-02-25 RX ORDER — LISINOPRIL 10 MG/1
10 TABLET ORAL DAILY
Qty: 100 TABLET | Refills: 3 | Status: CANCELLED | OUTPATIENT
Start: 2025-02-25 | End: 2026-04-01

## 2025-02-25 ASSESSMENT — ENCOUNTER SYMPTOMS
TREMORS: 0
CONSTITUTIONAL NEGATIVE: 1
GASTROINTESTINAL NEGATIVE: 1
FALLS: 0
BACK PAIN: 1
PSYCHIATRIC NEGATIVE: 1
WEIGHT LOSS: 0
TINGLING: 0
FOCAL WEAKNESS: 0
WEAKNESS: 1
RESPIRATORY NEGATIVE: 1
NECK PAIN: 0
CARDIOVASCULAR NEGATIVE: 1

## 2025-02-25 ASSESSMENT — PATIENT HEALTH QUESTIONNAIRE - PHQ9: CLINICAL INTERPRETATION OF PHQ2 SCORE: 0

## 2025-02-25 ASSESSMENT — FIBROSIS 4 INDEX: FIB4 SCORE: 1.78

## 2025-02-25 NOTE — PATIENT INSTRUCTIONS
-Ordenamos radiografia para rony si hay fractura. Les van a llamar para agendar.  -Ordenamos laboratorios que tiene que hacer antes de la visita la semana que viene  -Empiece a carloz tylenol hasta 3 veces por arnoldo para el dolor  -Continue con el flexeril hasta que se acabe, tome a la noche  -Continue con el lidocaine patch para el dolor de espalda  -Empiece a carloz vitamina D con calcio garrick vez por arnoldo  -Puse referral para terapia fisica, les van a llamar para agendar.  -Ordenamos un DEXA scan para rony que tan valarie son los huesos, les van a llamar para agendar.  -Empiece con amlodipina 5 mg garrick vez por arnoldo para la presion. Trate de comer menos sal a diario  -Trate de conseguir un manguito de presion arterial para medir la presion garrick vez por arnoldo y anote y traiga para la siguiente visita  -Puse referral para endocrinologia para que rony que casper esta la tiroides y tambien para cardiologia para rony que casper esta la valvula aortica

## 2025-02-25 NOTE — PROGRESS NOTES
New Patient    Leslie Friend is a 89 y.o. female who presents today with the following:    CC: Establish Care with Primary Care Physician Juan Carrillo MD    HPI:  PATIENT SUMMARY:  The patient presented with acute low back pain.    SUBJECTIVE:  The patient reported experiencing acute low back pain that began on Sunday, February 18, while attempting to stand from a chair. The pain was described as being over the midline of the back and bilateral, associated with bilateral cramps but without neurological symptoms. The patient had visited an urgent care clinic where an x-ray was performed, reportedly showing no fracture, and was prescribed Flexeril and a lidocaine patch.  This time she was also diagnosed with urinary infection and they prescribed nitrofurantoin which she took and finished the course.  The patient mentioned that Flexeril provided relief and enabled walking, but continued to use a wheelchair due to pain. The patient reported a history of high blood pressure, osteoporosis, and aortic stenosis. The patient did not report chest pain, dizziness, or palpitations. There was no change in appetite, but the patient mentioned difficulty sleeping. The patient's current medications included Metoprolol 25 mg which she gets from [place], Flexeril, and a lidocaine patch. The patient was not currently taking calcium carbonate or nitrofurantoin.    The patient had a history of osteoporosis, aortic stenosis (with a TAVR procedure reportedly performed in 2023), a left hip fracture post-intramedullary fixation, and incidental thyroid nodules found in 2023. The patient used a wheelchair due to back pain and reported being unable to walk without assistance.    Review of Systems   Constitutional: Negative.  Negative for malaise/fatigue and weight loss.   Respiratory: Negative.     Cardiovascular: Negative.    Gastrointestinal: Negative.    Musculoskeletal:  Positive for back pain and joint pain. Negative  "for falls and neck pain.   Skin: Negative.    Neurological:  Positive for weakness. Negative for tingling, tremors and focal weakness.   Psychiatric/Behavioral: Negative.         Past Medical History:   Diagnosis Date    Anesthesia     needed additonal anesthesia and then was slow to wake up    Disorder of thyroid     being worked up for thyroid issues    Heart valve disease     Hypertension     Lightheadedness 6/21/2022     Past Surgical History:   Procedure Laterality Date    FEMUR NAILING INTRAMEDULLARY  06/22/2022    Procedure: LEFT FEMUR SHORT NAIL;  Surgeon: Ashok Santiago M.D.;  Location: SURGERY [facility];  Service: Orthopedics    SHOULDER SURGERY Left 2022    broken shoulder    APPENDECTOMY       No family history on file.  Social History     Tobacco Use    Smoking status: Never    Smokeless tobacco: Never   Vaping Use    Vaping status: Never Used   Substance Use Topics    Alcohol use: No    Drug use: No     Current Outpatient Medications   Medication Sig Dispense Refill    cyclobenzaprine (FLEXERIL) 5 mg tablet Take 5-10 mg by mouth every evening.      amLODIPine (NORVASC) 5 MG Tab Take 1 Tablet by mouth every day. 100 Tablet 3    calcium/vitamin D 500-5 MG-MCG Tab Take 1 Tablet by mouth 2 times a day for 100 days. 200 Tablet 0    acetaminophen (TYLENOL) 500 MG Tab Take 1 Tablet by mouth every 6 hours as needed for Moderate Pain for up to 10 days. 30 Tablet 0    lidocaine (ASPERFLEX) 4 % Patch Place 1 Patch on the skin every 24 hours. 10 Patch 0    metoprolol SR (TOPROL XL) 25 MG TABLET SR 24 HR Take 0.5 Tablets by mouth every day. (Patient taking differently: Take 25 mg by mouth every day.) 30 Tablet 1     No current facility-administered medications for this visit.       BP (!) 196/69 (BP Location: Right arm, Patient Position: Sitting, BP Cuff Size: Child)   Pulse 72   Temp 36.5 °C (97.7 °F) (Temporal)   Ht 1.448 m (4' 9\")   Wt 35.6 kg (78 lb 6.4 oz)   SpO2 96%   BMI 16.97 kg/m²   Physical " Exam  Constitutional:       General: She is in acute distress.      Comments: Frail appearing woman that is sit on a wheelchair in acute pain   HENT:      Head: Normocephalic.   Eyes:      Conjunctiva/sclera: Conjunctivae normal.   Cardiovascular:      Rate and Rhythm: Normal rate and regular rhythm.      Pulses: Normal pulses.      Heart sounds: Murmur (Systolic) heard.   Pulmonary:      Effort: No respiratory distress.      Breath sounds: Normal breath sounds. No wheezing.   Abdominal:      General: Abdomen is flat. Bowel sounds are normal. There is no distension.   Musculoskeletal:         General: Tenderness (Tenderness present over thoracic/lumbar spine accompanied by paraspinal muscle tenderness without noticeable spasms) present.      Right lower leg: No edema.      Left lower leg: No edema.   Skin:     Capillary Refill: Capillary refill takes less than 2 seconds.      Findings: No bruising or erythema.   Neurological:      General: No focal deficit present.      Mental Status: She is alert and oriented to person, place, and time. Mental status is at baseline.      Cranial Nerves: No cranial nerve deficit.      Sensory: No sensory deficit.      Motor: Weakness (Generalized weakness) present.      Gait: Gait abnormal (Slow short step gait due to pain).   Psychiatric:         Mood and Affect: Mood normal.         Behavior: Behavior normal.         Thought Content: Thought content normal.         Judgment: Judgment normal.         Assessment and Plan:     1. Encounter to establish care  89-year-old [place] speaker woman who is frail and underweight with a past history of hypertension, severe aortic stenosis and osteoporosis complicated with pathological fractures that comes to reestablish care after loose to follow-up for 1-1/2 years.    2. Hypertension, unspecified type  During this visit the blood pressure was 196/69 and repeat blood pressure was 186/80 mmHg.  The patient takes metoprolol 25 mg which she gets  from [place].  Patient does not have any symptoms and denied palpitations, shortness of breath or headaches.  The patient mentioned that she is compliant with the medication but she does not follow a diet.  These was not addressed during the visit in the ED when it was noticed that the blood pressure was 200/70 and was attributed to pain  -Will continue with metoprolol 25 mg daily which she gets from [place] and would recommend to refill it our self.  -Started on amlodipine 5 mg daily  -Low-salt diet and DASH diet instructed  -Will follow-up closely with labs in order to start ACE inhibitors or ARB's.  Goal is to decrease less than 20 or 25% of current blood pressure in a couple months.  -Patient to get a home BP cuff and to bring a log of for next visit  - Comp Metabolic Panel; Future    3. Goiter, nontoxic, multinodular  Previously noted 3 T-RADS 4 nodules incidentally on CT scan.  Previous TSH was 0.01 with normal T4.  On physical examination there is a nodule palpated.  Patient is clinically stable with normal heart rate and normal palpitations and no recent changes in weight, no tremors or sweating or heat intolerance.  - TSH WITH REFLEX TO FT4; Future  - Referral to Endocrinology for possible FNA appreciate recommendations    4. Age-related osteoporosis with current pathological fracture with routine healing  No previous DEXA scan on epic.  Patient has had a left hip fracture status post internal fixation and milligram pain.  Recent back pain possibly related to a thoracic spine fracture.  The patient has been having fluctuating height over the course of several years and looks very frail.  She previous treatment was zoledronic acid.  -Start on vitamin D and calcium  -DEXA scan  -T-spine x-ray    5. Acute midline low back pain without sciatica  10-day history of back pain that started after she stood up from a seated position without any trauma.  On physical examination there is tenderness to palpation in the  midline over spinous processes and mild tenderness around paraspinal muscles without any clear muscle spasms.  She was prescribed with pain patches and Flexeril which he she has been using.  -Finish course of Flexeril 5 mg at night tomorrow.  Trying to avoid this medication for prolonged period of time  -Start on Tylenol 500 mg up to 3 times a day for pain  -With lidocaine patches up to twice a day  -We will get a new thoracic spine x-ray given that records from the urgent care clinic did not know go that high.  It was only noted to have a lumbar spine and hip x-rays without signs of fractures.  - DX-THORACIC SPINE-2 VIEWS; Future  - Referral to Physical Therapy    6. Encounter for immunization  - INFLUENZA VACCINE, HIGH DOSE (65+ ONLY)    7. Osteoporosis, unspecified osteoporosis type, unspecified pathological fracture presence  - DS-BONE DENSITY STUDY (DEXA); Future    8. Nonrheumatic aortic valve stenosis  Patient had a previous left heart cath for severe aortic stenosis, this was diagnostic and previous to TAVR.  It was determined that she's coronary artery disease and unstable coronary artery spasms which makes a TAVR higher risk and it was deferred.  - REFERRAL TO CARDIOLOGY        Orders Placed This Encounter    DS-BONE DENSITY STUDY (DEXA)    DX-THORACIC SPINE-2 VIEWS    INFLUENZA VACCINE, HIGH DOSE (65+ ONLY)    TSH WITH REFLEX TO FT4    Comp Metabolic Panel    REFERRAL TO CARDIOLOGY    Referral to Endocrinology    Referral to Physical Therapy    cyclobenzaprine (FLEXERIL) 5 mg tablet    amLODIPine (NORVASC) 5 MG Tab    calcium/vitamin D 500-5 MG-MCG Tab    acetaminophen (TYLENOL) 500 MG Tab    lidocaine (ASPERFLEX) 4 % Patch       Return in about 1 week (around 3/4/2025).    Juan Carrillo MD PGY I  Internal Medicine  [facility]

## 2025-02-27 ENCOUNTER — TELEPHONE (OUTPATIENT)
Dept: CARDIOLOGY | Facility: MEDICAL CENTER | Age: OVER 89
End: 2025-02-27
Payer: MEDICARE

## 2025-02-27 DIAGNOSIS — I35.0 SEVERE AORTIC STENOSIS: ICD-10-CM

## 2025-02-28 ENCOUNTER — HOSPITAL ENCOUNTER (OUTPATIENT)
Dept: CARDIOLOGY | Facility: MEDICAL CENTER | Age: OVER 89
End: 2025-02-28
Attending: INTERNAL MEDICINE
Payer: MEDICARE

## 2025-02-28 ENCOUNTER — HOSPITAL ENCOUNTER (OUTPATIENT)
Dept: LAB | Facility: MEDICAL CENTER | Age: OVER 89
End: 2025-02-28
Attending: FAMILY MEDICINE
Payer: MEDICARE

## 2025-02-28 DIAGNOSIS — I10 HYPERTENSION, UNSPECIFIED TYPE: ICD-10-CM

## 2025-02-28 DIAGNOSIS — I35.0 SEVERE AORTIC STENOSIS: ICD-10-CM

## 2025-02-28 DIAGNOSIS — E04.2 GOITER, NONTOXIC, MULTINODULAR: ICD-10-CM

## 2025-02-28 LAB
ALBUMIN SERPL BCP-MCNC: 4 G/DL (ref 3.2–4.9)
ALBUMIN/GLOB SERPL: 1 G/DL
ALP SERPL-CCNC: 117 U/L (ref 30–99)
ALT SERPL-CCNC: 11 U/L (ref 2–50)
ANION GAP SERPL CALC-SCNC: 9 MMOL/L (ref 7–16)
AST SERPL-CCNC: 23 U/L (ref 12–45)
BILIRUB SERPL-MCNC: 0.4 MG/DL (ref 0.1–1.5)
BUN SERPL-MCNC: 13 MG/DL (ref 8–22)
CALCIUM ALBUM COR SERPL-MCNC: 10.1 MG/DL (ref 8.5–10.5)
CALCIUM SERPL-MCNC: 10.1 MG/DL (ref 8.5–10.5)
CHLORIDE SERPL-SCNC: 104 MMOL/L (ref 96–112)
CO2 SERPL-SCNC: 23 MMOL/L (ref 20–33)
CREAT SERPL-MCNC: 0.53 MG/DL (ref 0.5–1.4)
GFR SERPLBLD CREATININE-BSD FMLA CKD-EPI: 88 ML/MIN/1.73 M 2
GLOBULIN SER CALC-MCNC: 3.9 G/DL (ref 1.9–3.5)
GLUCOSE SERPL-MCNC: 93 MG/DL (ref 65–99)
POTASSIUM SERPL-SCNC: 3.9 MMOL/L (ref 3.6–5.5)
PROT SERPL-MCNC: 7.9 G/DL (ref 6–8.2)
SODIUM SERPL-SCNC: 136 MMOL/L (ref 135–145)
T4 FREE SERPL-MCNC: 1.16 NG/DL (ref 0.93–1.7)
TSH SERPL DL<=0.005 MIU/L-ACNC: 0.04 UIU/ML (ref 0.38–5.33)

## 2025-02-28 PROCEDURE — 80053 COMPREHEN METABOLIC PANEL: CPT

## 2025-02-28 PROCEDURE — 36415 COLL VENOUS BLD VENIPUNCTURE: CPT

## 2025-02-28 PROCEDURE — 93306 TTE W/DOPPLER COMPLETE: CPT

## 2025-02-28 PROCEDURE — 84439 ASSAY OF FREE THYROXINE: CPT

## 2025-02-28 PROCEDURE — 84443 ASSAY THYROID STIM HORMONE: CPT

## 2025-02-28 NOTE — TELEPHONE ENCOUNTER
Referral from: Dr. Juan Carrillo MD.    Patient called on 2/27/2025; scheduled with patients daughter for repeat echo on 2/28/25 and Dr. Saunders on 3/6.    Discussed with patient's daughter consultation appointments, testing needed, and plan of care.    Patient's daughter given dates and times of testing and consultations.    All questions answered.    Phone number given to patient for [facility] for any further questions or concerns.

## 2025-03-03 ENCOUNTER — RESULTS FOLLOW-UP (OUTPATIENT)
Dept: CARDIOLOGY | Facility: MEDICAL CENTER | Age: OVER 89
End: 2025-03-03

## 2025-03-03 LAB
LV EJECT FRACT  99904: 62
LV EJECT FRACT MOD 2C 99903: 66.46
LV EJECT FRACT MOD 4C 99902: 55.75
LV EJECT FRACT MOD BP 99901: 60.2

## 2025-03-03 PROCEDURE — 93306 TTE W/DOPPLER COMPLETE: CPT | Mod: 26 | Performed by: INTERNAL MEDICINE

## 2025-03-06 ENCOUNTER — OFFICE VISIT (OUTPATIENT)
Dept: CARDIOLOGY | Facility: MEDICAL CENTER | Age: OVER 89
End: 2025-03-06
Attending: INTERNAL MEDICINE
Payer: MEDICARE

## 2025-03-06 VITALS
HEART RATE: 88 BPM | BODY MASS INDEX: 16.87 KG/M2 | OXYGEN SATURATION: 97 % | DIASTOLIC BLOOD PRESSURE: 70 MMHG | SYSTOLIC BLOOD PRESSURE: 120 MMHG | RESPIRATION RATE: 16 BRPM | WEIGHT: 78.2 LBS | HEIGHT: 57 IN

## 2025-03-06 DIAGNOSIS — I10 HYPERTENSION, UNSPECIFIED TYPE: ICD-10-CM

## 2025-03-06 DIAGNOSIS — I35.0 SEVERE AORTIC STENOSIS: ICD-10-CM

## 2025-03-06 PROCEDURE — G2211 COMPLEX E/M VISIT ADD ON: HCPCS | Performed by: INTERNAL MEDICINE

## 2025-03-06 PROCEDURE — 99214 OFFICE O/P EST MOD 30 MIN: CPT | Performed by: INTERNAL MEDICINE

## 2025-03-06 PROCEDURE — 99212 OFFICE O/P EST SF 10 MIN: CPT | Performed by: INTERNAL MEDICINE

## 2025-03-06 ASSESSMENT — FIBROSIS 4 INDEX: FIB4 SCORE: 2.59

## 2025-03-06 NOTE — PROGRESS NOTES
"CARDIOLOGY STRUCTURAL HEART FOLLOWUP    PCP: Juan Carrillo M.D.  REFERRING: Duane Bass MD    1. Severe aortic stenosis    2. Hypertension, unspecified type        Leslie Friend continues to be clinically stable with severe aortic stenosis by echocardiogram.  She is not having any overt symptoms.  Ongoing expectant management is advised-particularly she is not sure she would want to pursue intervention    Follow up: 3 months    History: Leslie Friend is a 89 y.o. female with history of osteoporosis, femur fragility fracture, humerus fracture presenting for follow-up of aortic stenosis.    She recently completed an echocardiogram again showing severe aortic valve stenosis with a mean gradient of 42 and normal LV systolic function    She is busy with everyday activities.  Primarily limited by low back pain    She is accompanied by her daughter who provides adjunctive history.  The patient had originally hoped she would pass at the same time as her  who  2 years ago.    PE:  /70 (BP Location: Left arm, Patient Position: Sitting, BP Cuff Size: Adult)   Pulse 88   Resp 16   Ht 1.448 m (4' 9\")   Wt 35.5 kg (78 lb 3.2 oz)   SpO2 97%   BMI 16.92 kg/m²   GEN: NAD  CARDIAC: regular mid peaking preserved carotid upstroke  RESP: Clear to auscultation bilaterally  ABD: Soft, non-tender, non-distended  EXT: No edema  NEURO: No focal deficit    () Today's E/M visit is associated with medical care services that serve as the continuing focal point for all needed health care services and/or with medical care services that  are part of ongoing care related to a patient's single, serious condition, or a complex condition: This includes  furnishing services to patients on an ongoing basis that result in care that is personalized  to the patient. The services result in a comprehensive, longitudinal, and continuous  relationship with the patient and involve delivery of " team-based care that is accessible, coordinated with other practitioners and providers, and integrated with the broader health  care landscape.     Past Medical History:   Diagnosis Date    Anesthesia     needed additonal anesthesia and then was slow to wake up    Disorder of thyroid     being worked up for thyroid issues    Heart valve disease     Hypertension     Lightheadedness 6/21/2022     No Known Allergies  Outpatient Encounter Medications as of 3/6/2025   Medication Sig Dispense Refill    amLODIPine (NORVASC) 5 MG Tab Take 1 Tablet by mouth every day. 100 Tablet 3    calcium/vitamin D 500-5 MG-MCG Tab Take 1 Tablet by mouth 2 times a day for 100 days. 200 Tablet 0    acetaminophen (TYLENOL) 500 MG Tab Take 1 Tablet by mouth every 6 hours as needed for Moderate Pain for up to 10 days. 30 Tablet 0    lidocaine (ASPERFLEX) 4 % Patch Place 1 Patch on the skin every 24 hours. 10 Patch 0    metoprolol SR (TOPROL XL) 25 MG TABLET SR 24 HR Take 0.5 Tablets by mouth every day. (Patient taking differently: Take 25 mg by mouth every day.) 30 Tablet 1    [DISCONTINUED] cyclobenzaprine (FLEXERIL) 5 mg tablet Take 5-10 mg by mouth every evening. (Patient not taking: Reported on 3/6/2025)       No facility-administered encounter medications on file as of 3/6/2025.     Social History     Socioeconomic History    Marital status:      Spouse name: Not on file    Number of children: Not on file    Years of education: Not on file    Highest education level: Not on file   Occupational History    Not on file   Tobacco Use    Smoking status: Never    Smokeless tobacco: Never   Vaping Use    Vaping status: Never Used   Substance and Sexual Activity    Alcohol use: No    Drug use: No    Sexual activity: Never   Other Topics Concern    Not on file   Social History Narrative    Not on file     Social Drivers of Health     Financial Resource Strain: Not on file   Food Insecurity: Not on file   Transportation Needs: Not on file  "  Physical Activity: Not on file   Stress: Not on file   Social Connections: Not on file   Intimate Partner Violence: Not on file   Housing Stability: Not on file       Studies  No results found for: \"CHOLSTRLTOT\", \"LDL\", \"HDL\", \"TRIGLYCERIDE\"    Lab Results   Component Value Date/Time    SODIUM 136 02/28/2025 09:03 AM    POTASSIUM 3.9 02/28/2025 09:03 AM    CHLORIDE 104 02/28/2025 09:03 AM    CO2 23 02/28/2025 09:03 AM    GLUCOSE 93 02/28/2025 09:03 AM    BUN 13 02/28/2025 09:03 AM    CREATININE 0.53 02/28/2025 09:03 AM      Lab Results   Component Value Date/Time    PROTHROMBTM 13.8 07/06/2023 07:45 AM    INR 1.07 07/06/2023 07:45 AM      Lab Results   Component Value Date/Time    WBC 6.5 07/06/2023 07:45 AM    RBC 4.22 07/06/2023 07:45 AM    HEMOGLOBIN 13.4 07/06/2023 07:45 AM    HEMATOCRIT 41.0 07/06/2023 07:45 AM    MCV 97.2 07/06/2023 07:45 AM    MCH 31.8 07/06/2023 07:45 AM    MCHC 32.7 07/06/2023 07:45 AM    MPV 10.2 07/06/2023 07:45 AM    NEUTSPOLYS 87.60 (H) 06/22/2022 02:32 AM    LYMPHOCYTES 5.00 (L) 06/22/2022 02:32 AM    MONOCYTES 6.40 06/22/2022 02:32 AM    EOSINOPHILS 0.30 06/22/2022 02:32 AM    BASOPHILS 0.30 06/22/2022 02:32 AM        Chief Complaint   Patient presents with    Aortic Stenosis    Hypertension       ROS:   10 point review systems is otherwise negative except as per the HPI  "

## 2025-03-11 ENCOUNTER — HOSPITAL ENCOUNTER (OUTPATIENT)
Dept: RADIOLOGY | Facility: MEDICAL CENTER | Age: OVER 89
End: 2025-03-11
Attending: INTERNAL MEDICINE
Payer: MEDICARE

## 2025-03-11 DIAGNOSIS — M81.0 OSTEOPOROSIS, UNSPECIFIED OSTEOPOROSIS TYPE, UNSPECIFIED PATHOLOGICAL FRACTURE PRESENCE: ICD-10-CM

## 2025-03-11 PROCEDURE — 77080 DXA BONE DENSITY AXIAL: CPT

## 2025-03-13 ENCOUNTER — HOSPITAL ENCOUNTER (OUTPATIENT)
Dept: RADIOLOGY | Facility: MEDICAL CENTER | Age: OVER 89
End: 2025-03-13
Attending: INTERNAL MEDICINE
Payer: MEDICARE

## 2025-03-13 DIAGNOSIS — M54.50 ACUTE MIDLINE LOW BACK PAIN WITHOUT SCIATICA: ICD-10-CM

## 2025-03-13 PROCEDURE — 72072 X-RAY EXAM THORAC SPINE 3VWS: CPT

## 2025-04-07 ENCOUNTER — APPOINTMENT (OUTPATIENT)
Dept: INTERNAL MEDICINE | Facility: OTHER | Age: OVER 89
End: 2025-04-07
Payer: MEDICARE

## 2025-04-07 VITALS
HEART RATE: 87 BPM | DIASTOLIC BLOOD PRESSURE: 72 MMHG | HEIGHT: 60 IN | BODY MASS INDEX: 15.71 KG/M2 | TEMPERATURE: 98.9 F | SYSTOLIC BLOOD PRESSURE: 186 MMHG | WEIGHT: 80 LBS | OXYGEN SATURATION: 98 %

## 2025-04-07 DIAGNOSIS — R54 FRAILTY: ICD-10-CM

## 2025-04-07 DIAGNOSIS — E44.0 MODERATE PROTEIN-CALORIE MALNUTRITION (HCC): ICD-10-CM

## 2025-04-07 DIAGNOSIS — E05.90 HYPERTHYROIDISM: ICD-10-CM

## 2025-04-07 DIAGNOSIS — I10 HYPERTENSION, UNSPECIFIED TYPE: ICD-10-CM

## 2025-04-07 PROCEDURE — 99214 OFFICE O/P EST MOD 30 MIN: CPT | Mod: GC | Performed by: INTERNAL MEDICINE

## 2025-04-07 PROCEDURE — 3078F DIAST BP <80 MM HG: CPT | Mod: GC | Performed by: INTERNAL MEDICINE

## 2025-04-07 PROCEDURE — 3077F SYST BP >= 140 MM HG: CPT | Mod: GC | Performed by: INTERNAL MEDICINE

## 2025-04-07 RX ORDER — METOPROLOL SUCCINATE 25 MG/1
50 TABLET, EXTENDED RELEASE ORAL DAILY
Qty: 30 TABLET | Refills: 1 | Status: SHIPPED | OUTPATIENT
Start: 2025-04-07 | End: 2025-04-07

## 2025-04-07 RX ORDER — METOPROLOL SUCCINATE 25 MG/1
50 TABLET, EXTENDED RELEASE ORAL DAILY
Qty: 30 TABLET | Refills: 1 | Status: SHIPPED | OUTPATIENT
Start: 2025-04-07

## 2025-04-07 RX ORDER — LISINOPRIL 10 MG/1
5 TABLET ORAL DAILY
Qty: 50 TABLET | Refills: 3 | Status: SHIPPED | OUTPATIENT
Start: 2025-04-07 | End: 2026-05-12

## 2025-04-07 RX ORDER — LISINOPRIL 10 MG/1
10 TABLET ORAL DAILY
Qty: 100 TABLET | Refills: 3 | Status: SHIPPED | OUTPATIENT
Start: 2025-04-07 | End: 2025-04-07 | Stop reason: CLARIF

## 2025-04-07 ASSESSMENT — ENCOUNTER SYMPTOMS
CONSTITUTIONAL NEGATIVE: 1
TINGLING: 0
TREMORS: 0
BACK PAIN: 1
FOCAL WEAKNESS: 0
RESPIRATORY NEGATIVE: 1
PSYCHIATRIC NEGATIVE: 1
WEIGHT LOSS: 0
GASTROINTESTINAL NEGATIVE: 1
WEAKNESS: 1
NECK PAIN: 0
FALLS: 0
CARDIOVASCULAR NEGATIVE: 1

## 2025-04-07 ASSESSMENT — FIBROSIS 4 INDEX: FIB4 SCORE: 2.59

## 2025-04-07 NOTE — PATIENT INSTRUCTIONS
-Llame a Endocrinologia para agendar [place]: [phone]   -Empiece lisinopril 5 mg diario  -Continue con metoprolol 50 mg al arnoldo  -continue con amlodipine 5 mg al arnoldo  -[facility] laboratorio para siguiente visita  -Estamos ordenando un estudio de la tiroides tambien

## 2025-04-07 NOTE — PROGRESS NOTES
Established Patient    Patient Care Team:  Juan Carrillo M.D. as PCP - General (Internal Medicine)    Leslie Friend is a 89 y.o. female who presents today with the following Chief Complaint(s): Follow up for Diagnoses of Hypertension, unspecified type, Hyperthyroidism, Moderate protein-calorie malnutrition (HCC), and Frailty were pertinent to this visit.    HPI:    The patient reported improvement in back pain, which was previously severe due to multiple spine fractures. The patient mentioned a history of high blood pressure, with the last recorded measurement being 186/72 mmHg. The patient had been taking amlodipine and metoprolol, which she gets from [facility] (100 mg tablet) is currently taking 50 mg. Denied any high blood pressure related symptoms. The patient reported dry eyes and temporary blurred vision upon waking, which resolves throughout the day. The patient does not report current back or abdominal pain. The patient mentioned a history of hyperthyroidism and noted a pending endocrinology referral. Social history revealed the patient is , having lost her  a year ago, and has experienced significant weight loss, currently weighing 80 pounds (36 kg). The patient reported a previous lack of appetite following her 's death. Pertinent negatives include no current use of a wheelchair and no headaches.    Review of Systems   Constitutional: Negative.  Negative for malaise/fatigue and weight loss.   Respiratory: Negative.     Cardiovascular: Negative.    Gastrointestinal: Negative.    Musculoskeletal:  Positive for back pain and joint pain. Negative for falls and neck pain.   Skin: Negative.    Neurological:  Positive for weakness. Negative for tingling, tremors and focal weakness.   Psychiatric/Behavioral: Negative.         Past Medical History:   Diagnosis Date    Anesthesia     needed additonal anesthesia and then was slow to wake up    Disorder of thyroid     being  worked up for thyroid issues    Heart valve disease     Hypertension     Lightheadedness 6/21/2022     Social History     Tobacco Use    Smoking status: Never    Smokeless tobacco: Never   Vaping Use    Vaping status: Never Used   Substance Use Topics    Alcohol use: No    Drug use: No     Current Outpatient Medications   Medication Sig Dispense Refill    lisinopril (PRINIVIL) 10 MG Tab Take 0.5 Tablets by mouth every day. 50 Tablet 3    metoprolol SR (TOPROL XL) 25 MG TABLET SR 24 HR Take 2 Tablets by mouth every day. 30 Tablet 1    amLODIPine (NORVASC) 5 MG Tab Take 1 Tablet by mouth every day. 100 Tablet 3    calcium/vitamin D 500-5 MG-MCG Tab Take 1 Tablet by mouth 2 times a day for 100 days. 200 Tablet 0     No current facility-administered medications for this visit.       BP (!) 186/72 (BP Location: Right arm, Patient Position: Sitting, BP Cuff Size: Small adult) Comment: 186/72 - 3rd time with BM - first time with me - 189/80  Pulse 87   Temp 37.2 °C (98.9 °F) (Temporal)   Ht 1.524 m (5') Comment: per pt daughter- pt has walker and has balance issues  Wt 36.3 kg (80 lb)   SpO2 98%   BMI 15.62 kg/m²   Physical Exam  Constitutional:       Comments: Frail appearing woman   HENT:      Head: Normocephalic.   Eyes:      Conjunctiva/sclera: Conjunctivae normal.   Cardiovascular:      Rate and Rhythm: Normal rate and regular rhythm.      Pulses: Normal pulses.      Heart sounds: Murmur (Systolic) heard.   Pulmonary:      Effort: No respiratory distress.      Breath sounds: Normal breath sounds. No wheezing.   Abdominal:      General: Abdomen is flat. Bowel sounds are normal. There is no distension.   Musculoskeletal:         General: Tenderness (Minimal Tenderness present over thoracic/lumbar spine accompanied by paraspinal muscle tenderness without noticeable spasms) present.      Right lower leg: No edema.      Left lower leg: No edema.   Skin:     Capillary Refill: Capillary refill takes less than 2  seconds.      Findings: No bruising or erythema.   Neurological:      General: No focal deficit present.      Mental Status: She is alert and oriented to person, place, and time. Mental status is at baseline.      Cranial Nerves: No cranial nerve deficit.      Sensory: No sensory deficit.      Motor: Weakness (Generalized weakness) present.      Gait: Gait abnormal (Slow short step gait).   Psychiatric:         Mood and Affect: Mood normal.         Behavior: Behavior normal.         Thought Content: Thought content normal.         Judgment: Judgment normal.         Assessment and Plan:     1. Hypertension, unspecified type  The patient's blood pressure remains significantly elevated at 186/72 mmHg despite current treatment with amlodipine and metoprolol. This may require adjustment of the current medication regimen or addition of another antihypertensive agent to achieve better control. Contributing factors may include arterial stiffness associated with age.  Has been getting metoprolol 100 mg tablets from [place] in he is having difficulties cutting the pills.  During last visit he metoprolol 25 mg daily was prescribed by patient has been taking 50 mg daily  - Repeat blood pressure was similar reading  -Continue with metoprolol 50 mg daily and amlodipine 5 mg daily  -Start on lisinopril 5 mg daily  -DASH diet handout again given  - metoprolol SR (TOPROL XL) 25 MG TABLET SR 24 HR; Take 2 Tablets by mouth every day.  Dispense: 30 Tablet; Refill: 1    2. Hyperthyroidism  Previously noted 3 T-RADS 4 nodules incidentally on CT scan.  Previous TSH was 0.01 with normal T4.  On physical examination there is a nodule palpated.  Patient is clinically stable with normal heart rate and normal palpitations and no recent changes in weight, no tremors or sweating or heat intolerance.  Recent TSH was 0.039 with normal T4 levels.  -Given that patient has several risk factors including age more than 65, cardiovascular disease and  severe aortic sclerosis and osteoporosis with pathological fractures source of hyperthyroidism must be addressed.  -Patient will benefit from endocrinology consult as she has hypothyroidism and osteoporosis (Confirmed by DEXA scan in 2025).  The patient has already finished a course of zoledronic acid in 2022 and currently has several multiple level spine fractures.  -Patient was previously referred to endocrinology but she not get call.  -Provided phone number for her to call and schedule visit with endocrinology  - T3 FREE; Future  - TSH+FREE T4  - NM-THYROID UPTAKE 5HR; Future    3. Moderate protein-calorie malnutrition (HCC)  4. Frailty  - Referral to Nutrition Services  - VITAMIN D,25 HYDROXY (DEFICIENCY); Future      Orders Placed This Encounter    NM-THYROID UPTAKE 5HR    T3 FREE    TSH+FREE T4    VITAMIN D,25 HYDROXY (DEFICIENCY)    Referral to Nutrition Services    DISCONTD: lisinopril (PRINIVIL) 10 MG Tab    lisinopril (PRINIVIL) 10 MG Tab    DISCONTD: metoprolol SR (TOPROL XL) 25 MG TABLET SR 24 HR    metoprolol SR (TOPROL XL) 25 MG TABLET SR 24 HR       Return in about 1 week (around 4/14/2025).    Juan Carrillo MD PGY I  Internal Medicine  [facility] [employer]

## 2025-04-09 ENCOUNTER — HOSPITAL ENCOUNTER (OUTPATIENT)
Dept: LAB | Facility: MEDICAL CENTER | Age: OVER 89
End: 2025-04-09
Attending: INTERNAL MEDICINE
Payer: MEDICARE

## 2025-04-09 DIAGNOSIS — E05.90 HYPERTHYROIDISM: ICD-10-CM

## 2025-04-09 DIAGNOSIS — R54 FRAILTY: ICD-10-CM

## 2025-04-09 LAB
25(OH)D3 SERPL-MCNC: 34 NG/ML (ref 30–100)
T3FREE SERPL-MCNC: 4.2 PG/ML (ref 2–4.4)
T4 FREE SERPL-MCNC: 1.17 NG/DL (ref 0.93–1.7)
TSH SERPL-ACNC: 0.01 UIU/ML (ref 0.38–5.33)

## 2025-04-09 PROCEDURE — 84443 ASSAY THYROID STIM HORMONE: CPT

## 2025-04-09 PROCEDURE — 36415 COLL VENOUS BLD VENIPUNCTURE: CPT

## 2025-04-09 PROCEDURE — 84439 ASSAY OF FREE THYROXINE: CPT

## 2025-04-09 PROCEDURE — 84481 FREE ASSAY (FT-3): CPT

## 2025-04-09 PROCEDURE — 82306 VITAMIN D 25 HYDROXY: CPT | Mod: GA

## 2025-04-11 ENCOUNTER — TELEPHONE (OUTPATIENT)
Dept: INTERNAL MEDICINE | Facility: OTHER | Age: OVER 89
End: 2025-04-11
Payer: MEDICARE

## 2025-04-11 NOTE — TELEPHONE ENCOUNTER
Received a voicemail from patient's daughter, stating that they have not received the Lisinopril, and that we need to send a refill.     However,patient should have plenty of refills.   Can anyone call the pharmacy to verify this?   Thank you.   
Spoke to pharmacist, they are filling medication.    Spoke to pt's daughter to update her and she is aware.   
Urinary retention

## 2025-04-14 ENCOUNTER — APPOINTMENT (OUTPATIENT)
Dept: INTERNAL MEDICINE | Facility: OTHER | Age: OVER 89
End: 2025-04-14
Payer: MEDICARE

## 2025-05-08 ENCOUNTER — HOSPITAL ENCOUNTER (OUTPATIENT)
Dept: RADIOLOGY | Facility: MEDICAL CENTER | Age: OVER 89
End: 2025-05-08
Attending: INTERNAL MEDICINE
Payer: MEDICARE

## 2025-05-08 DIAGNOSIS — E05.90 HYPERTHYROIDISM: ICD-10-CM

## 2025-05-08 PROCEDURE — A9516 IODINE I-123 SOD IODIDE MIC: HCPCS

## 2025-05-19 ENCOUNTER — APPOINTMENT (OUTPATIENT)
Dept: INTERNAL MEDICINE | Facility: OTHER | Age: OVER 89
End: 2025-05-19
Payer: MEDICARE

## 2025-05-19 VITALS
HEART RATE: 68 BPM | OXYGEN SATURATION: 96 % | DIASTOLIC BLOOD PRESSURE: 73 MMHG | WEIGHT: 82 LBS | HEIGHT: 57 IN | SYSTOLIC BLOOD PRESSURE: 168 MMHG | BODY MASS INDEX: 17.69 KG/M2 | TEMPERATURE: 99.3 F

## 2025-05-19 DIAGNOSIS — E05.90 HYPERTHYROIDISM: ICD-10-CM

## 2025-05-19 DIAGNOSIS — R54 FRAILTY: ICD-10-CM

## 2025-05-19 DIAGNOSIS — I10 HYPERTENSION, UNSPECIFIED TYPE: ICD-10-CM

## 2025-05-19 DIAGNOSIS — E05.00 TOXIC GOITER: Primary | ICD-10-CM

## 2025-05-19 DIAGNOSIS — M81.0 OSTEOPOROSIS, UNSPECIFIED OSTEOPOROSIS TYPE, UNSPECIFIED PATHOLOGICAL FRACTURE PRESENCE: ICD-10-CM

## 2025-05-19 PROBLEM — E03.8 OTHER SPECIFIED HYPOTHYROIDISM: Status: RESOLVED | Noted: 2023-06-28 | Resolved: 2025-05-19

## 2025-05-19 PROCEDURE — 3077F SYST BP >= 140 MM HG: CPT | Mod: GC | Performed by: INTERNAL MEDICINE

## 2025-05-19 PROCEDURE — 99214 OFFICE O/P EST MOD 30 MIN: CPT | Mod: GC | Performed by: INTERNAL MEDICINE

## 2025-05-19 PROCEDURE — 3078F DIAST BP <80 MM HG: CPT | Mod: GC | Performed by: INTERNAL MEDICINE

## 2025-05-19 RX ORDER — LISINOPRIL 10 MG/1
5 TABLET ORAL DAILY
Qty: 50 TABLET | Refills: 3 | Status: SHIPPED | OUTPATIENT
Start: 2025-05-19 | End: 2026-06-23

## 2025-05-19 RX ORDER — METOPROLOL SUCCINATE 50 MG/1
50 TABLET, EXTENDED RELEASE ORAL DAILY
Qty: 90 TABLET | Refills: 1 | Status: SHIPPED | OUTPATIENT
Start: 2025-05-19

## 2025-05-19 ASSESSMENT — FIBROSIS 4 INDEX: FIB4 SCORE: 2.59

## 2025-05-19 NOTE — PATIENT INSTRUCTIONS
- Llame a [facility] [phone] para la tiroides  - Para nutricion llame a [phone]   - Cardiologia, tiene kimberley el 7/7  - Aumente lisinopril a 10 mg por arnoldo. Continue con metoprolol 50 mg por arnoldo y amlodipina 5 mg por arnoldo  - Vaya a [facility] para la vacuna de shingles y DTAP (tetanos)

## 2025-05-19 NOTE — PROGRESS NOTES
Established Patient    Patient Care Team:  Juan Carrillo M.D. as PCP - General (Internal Medicine)    Leslie Friend is a 89 y.o. 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 Diagnoses of Hypertension, unspecified type, Hyperthyroidism, Frailty, and Osteoporosis, unspecified osteoporosis type, unspecified pathological fracture presence were also pertinent to this visit.    HPI:  Patient Summary:  The patient, an 89-year-old female, presents for a follow-up appointment primarily to address uncontrolled hypertension.    Subjective:  The patient has a history of hypertension and is currently taking Amlodipine 5 mg, Lisinopril 5 mg, and Metoprolol 50 mg daily. Her blood pressure readings have been consistently high, with today's measurement at 173/82 mmHg. She reports adherence to her medication regimen and a low-salt diet. She does not report any symptoms such as palpitations, vision changes, headaches, or chest pain.    The patient has an enlarged thyroid with a nodule, identified as a toxic goiter, causing hyperthyroidism. Her last TSH was 0.014 with normal T4 and T3 levels. The patient has not been contacted by endocrinology or cardiology for follow-up appointments. She has not experienced symptoms associated with thyroid issues, such as weight loss; instead, she has noted a slight weight gain.    The patient has a history of severe aortic stenosis and underwent a left heart catheterization two years ago, which was complicated by coronary spasms, making her a poor surgical candidate for valve replacement. She reports no overt cardiac symptoms and has not been referred for further cardiac intervention.    She also has osteoporosis and previously received Reclast in 2024. She uses a walker or cane for mobility and reports no current back or neck pain, despite previous fractures.    Review of Systems:  - Cardiovascular: High  Returned call, left message. Patient is aware today's appointment is canceled.    "blood pressure readings, no chest pain, palpitations, or dizziness.  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  - Musculoskeletal: History of osteoporosis, no current back or neck pain.  - All other review of systems negative except for listed above.        Past Medical History[1]  Social History[2]  Current Medications[3]    BP (!) 168/73 (BP Location: Left arm, Patient Position: Sitting, BP Cuff Size: Small adult)   Pulse 68   Temp 37.4 °C (99.3 °F) (Temporal)   Ht 1.448 m (4' 9\")   Wt 37.2 kg (82 lb)   SpO2 96%   BMI 17.74 kg/m²   Physical Exam  Constitutional:       Comments: Frail appearing woman   HENT:      Head: Normocephalic.   Eyes:      Conjunctiva/sclera: Conjunctivae normal.   Cardiovascular:      Rate and Rhythm: Normal rate and regular rhythm.      Pulses: Normal pulses.      Heart sounds: Murmur (Systolic) heard.   Pulmonary:      Effort: No respiratory distress.      Breath sounds: Normal breath sounds. No wheezing.   Abdominal:      General: Abdomen is flat. Bowel sounds are normal. There is no distension.   Musculoskeletal:         General: No tenderness.      Right lower leg: No edema.      Left lower leg: No edema.   Skin:     Capillary Refill: Capillary refill takes less than 2 seconds.      Findings: No bruising or erythema.   Neurological:      General: No focal deficit present.      Mental Status: She is alert and oriented to person, place, and time. Mental status is at baseline.      Cranial Nerves: No cranial nerve deficit.      Sensory: No sensory deficit.      Motor: Weakness (Generalized weakness) present.      Gait: Gait abnormal (Slow short step gait).   Psychiatric:         Mood and Affect: Mood normal.         Behavior: Behavior normal.         Thought Content: Thought content normal.         Judgment: Judgment normal.         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 She lives independently with the daughter and uses a walker or cane for mobility.    1. Hypertension, unspecified type  Patient's blood pressure remains significantly elevated during clinic visit at 173/82 and repeat was 168/75 mmHg, despite being compliant with 3 medications (amlodipine 5 mg, lisinopril 5 mg and metoprolol 50 mg daily).  She is also compliant with low-salt diet and has been followed DASH diet.  Contributing factors may include arterial stiffness associated with age.  During last visit amlodipine was started.  - Increase Lisinopril to 10 mg daily.  - Continue with amlodipine 5 mg and metoprolol 50 mg daily  - Monitor blood pressure closely and instructed patient and daughter to bring BP log next visit  - Comp Metabolic Panel; Future    2. Toxic goiter (Primary)  3. Hyperthyroidism  Previously noted 3 T-RADS 4 nodules incidentally on CT scan. Previous TSH was 0.014 with normal T4. On physical examination there is a nodule palpated. Patient is clinically stable with normal heart rate and normal palpitations and no recent changes in weight, no tremors or sweating or heat intolerance. The patient has several risk factors including age more than 65, cardiovascular disease and severe aortic sclerosis and osteoporosis with pathological fractures.  - Endocrinology follow-up needed for potential surgical intervention vs radioactive iodine ablation.  - Provide endocrinology contact information for scheduling.  - This will also be beneficial as patient has osteoporosis     4. Osteoporosis  Confirmed by DEXA scan in 2025.  The patient has already finished a course of zoledronic acid and currently has several multiple level spine fractures.   - Current management with calcium and vitamin D supplementation.  - Consider bisphosphonates holiday and reassess later this year.  - Endocrinology referral    The patient is also advised to seek  vaccinations for shingles and tetanus at a pharmacy. Follow-up appointments with endocrinology and cardiology are emphasized for comprehensive management.    Orders Placed This Encounter    Comp Metabolic Panel    metoprolol SR (TOPROL XL) 50 MG TABLET SR 24 HR    lisinopril (PRINIVIL) 10 MG Tab       Return in about 3 months (around 8/19/2025) for For annual wellness visit.    Juan Carrillo MD PGY I  Internal Medicine  [facility]       [1]   Past Medical History:  Diagnosis Date    Anesthesia     needed additonal anesthesia and then was slow to wake up    Disorder of thyroid     being worked up for thyroid issues    Heart valve disease     Hypertension     Lightheadedness 6/21/2022   [2]   Social History  Tobacco Use    Smoking status: Never    Smokeless tobacco: Never   Vaping Use    Vaping status: Never Used   Substance Use Topics    Alcohol use: No    Drug use: No   [3]   Current Outpatient Medications   Medication Sig Dispense Refill    metoprolol SR (TOPROL XL) 50 MG TABLET SR 24 HR Take 1 Tablet by mouth every day. 90 Tablet 1    lisinopril (PRINIVIL) 10 MG Tab Take 0.5 Tablets by mouth every day. 50 Tablet 3    amLODIPine (NORVASC) 5 MG Tab Take 1 Tablet by mouth every day. 100 Tablet 3    calcium/vitamin D 500-5 MG-MCG Tab Take 1 Tablet by mouth 2 times a day for 100 days. 200 Tablet 0     No current facility-administered medications for this visit.

## 2025-07-07 ENCOUNTER — APPOINTMENT (OUTPATIENT)
Dept: CARDIOLOGY | Facility: MEDICAL CENTER | Age: OVER 89
End: 2025-07-07
Attending: NURSE PRACTITIONER
Payer: MEDICARE

## 2025-07-07 DIAGNOSIS — I10 HYPERTENSION, UNSPECIFIED TYPE: ICD-10-CM

## 2025-07-07 DIAGNOSIS — I35.0 SEVERE AORTIC STENOSIS: Primary | ICD-10-CM

## 2025-08-12 ENCOUNTER — APPOINTMENT (OUTPATIENT)
Dept: INTERNAL MEDICINE | Facility: OTHER | Age: OVER 89
End: 2025-08-12
Payer: MEDICARE

## 2025-08-12 ASSESSMENT — ENCOUNTER SYMPTOMS: GENERAL WELL-BEING: FAIR

## 2025-08-12 ASSESSMENT — ACTIVITIES OF DAILY LIVING (ADL): BATHING_REQUIRES_ASSISTANCE: 0

## 2025-08-12 ASSESSMENT — PATIENT HEALTH QUESTIONNAIRE - PHQ9: CLINICAL INTERPRETATION OF PHQ2 SCORE: 0

## 2025-08-13 ENCOUNTER — TELEPHONE (OUTPATIENT)
Dept: INTERNAL MEDICINE | Facility: OTHER | Age: OVER 89
End: 2025-08-13
Payer: MEDICARE

## 2025-08-14 ENCOUNTER — OFFICE VISIT (OUTPATIENT)
Dept: CARDIOLOGY | Facility: MEDICAL CENTER | Age: OVER 89
End: 2025-08-14
Payer: MEDICARE

## 2025-08-14 VITALS
DIASTOLIC BLOOD PRESSURE: 48 MMHG | HEART RATE: 54 BPM | OXYGEN SATURATION: 97 % | RESPIRATION RATE: 18 BRPM | BODY MASS INDEX: 17.74 KG/M2 | HEIGHT: 57 IN | SYSTOLIC BLOOD PRESSURE: 150 MMHG

## 2025-08-14 DIAGNOSIS — I35.0 SEVERE AORTIC STENOSIS: Primary | ICD-10-CM

## 2025-08-14 DIAGNOSIS — I10 HYPERTENSION, UNSPECIFIED TYPE: ICD-10-CM

## 2025-08-14 PROCEDURE — 3078F DIAST BP <80 MM HG: CPT

## 2025-08-14 PROCEDURE — 99212 OFFICE O/P EST SF 10 MIN: CPT

## 2025-08-14 PROCEDURE — 99214 OFFICE O/P EST MOD 30 MIN: CPT

## 2025-08-14 PROCEDURE — 3077F SYST BP >= 140 MM HG: CPT

## 2025-08-14 ASSESSMENT — ENCOUNTER SYMPTOMS
NEUROLOGICAL NEGATIVE: 1
BACK PAIN: 1
CONSTITUTIONAL NEGATIVE: 1
SHORTNESS OF BREATH: 0
GASTROINTESTINAL NEGATIVE: 1
PALPITATIONS: 0
ORTHOPNEA: 0
PND: 0
NERVOUS/ANXIOUS: 0
DEPRESSION: 0
EYES NEGATIVE: 1

## 2025-08-14 ASSESSMENT — LIFESTYLE VARIABLES
AUDIT-C TOTAL SCORE: 0
HOW OFTEN DO YOU HAVE SIX OR MORE DRINKS ON ONE OCCASION: NEVER
HOW OFTEN DO YOU HAVE A DRINK CONTAINING ALCOHOL: NEVER
HOW MANY STANDARD DRINKS CONTAINING ALCOHOL DO YOU HAVE ON A TYPICAL DAY: PATIENT DOES NOT DRINK
SKIP TO QUESTIONS 9-10: 1

## 2025-08-19 ENCOUNTER — OFFICE VISIT (OUTPATIENT)
Dept: INTERNAL MEDICINE | Facility: OTHER | Age: OVER 89
End: 2025-08-19
Payer: MEDICARE

## 2025-08-19 ENCOUNTER — HOSPITAL ENCOUNTER (OUTPATIENT)
Dept: RADIOLOGY | Facility: MEDICAL CENTER | Age: OVER 89
End: 2025-08-19
Attending: INTERNAL MEDICINE
Payer: MEDICARE

## 2025-08-19 VITALS
TEMPERATURE: 98.9 F | DIASTOLIC BLOOD PRESSURE: 63 MMHG | HEART RATE: 84 BPM | SYSTOLIC BLOOD PRESSURE: 138 MMHG | OXYGEN SATURATION: 97 %

## 2025-08-19 DIAGNOSIS — E05.90 HYPERTHYROIDISM: ICD-10-CM

## 2025-08-19 DIAGNOSIS — M25.551 BILATERAL HIP PAIN: ICD-10-CM

## 2025-08-19 DIAGNOSIS — R53.1 WEAKNESS: ICD-10-CM

## 2025-08-19 DIAGNOSIS — M80.00XD AGE-RELATED OSTEOPOROSIS WITH CURRENT PATHOLOGICAL FRACTURE WITH ROUTINE HEALING: ICD-10-CM

## 2025-08-19 DIAGNOSIS — M25.552 BILATERAL HIP PAIN: ICD-10-CM

## 2025-08-19 DIAGNOSIS — M54.50 ACUTE BILATERAL LOW BACK PAIN WITHOUT SCIATICA: Primary | ICD-10-CM

## 2025-08-19 DIAGNOSIS — E05.00 TOXIC GOITER: ICD-10-CM

## 2025-08-19 DIAGNOSIS — Z74.09 MOBILITY IMPAIRED: ICD-10-CM

## 2025-08-19 PROCEDURE — 73521 X-RAY EXAM HIPS BI 2 VIEWS: CPT

## 2025-08-19 PROCEDURE — 3075F SYST BP GE 130 - 139MM HG: CPT | Performed by: INTERNAL MEDICINE

## 2025-08-19 PROCEDURE — 99213 OFFICE O/P EST LOW 20 MIN: CPT | Mod: GE | Performed by: INTERNAL MEDICINE

## 2025-08-19 PROCEDURE — 3078F DIAST BP <80 MM HG: CPT | Performed by: INTERNAL MEDICINE

## 2025-08-19 RX ORDER — MENTHOL 205.5 MG/1
PATCH TOPICAL
Qty: 14 PATCH | Refills: 2 | Status: SHIPPED | OUTPATIENT
Start: 2025-08-19

## (undated) DEVICE — TUBING CLEARLINK DUO-VENT - C-FLO (48EA/CA)

## (undated) DEVICE — SUTURE GENERAL

## (undated) DEVICE — LACTATED RINGERS INJ 1000 ML - (14EA/CA 60CA/PF)

## (undated) DEVICE — GOWN WARMING STANDARD FLEX - (30/CA)

## (undated) DEVICE — KIT ANESTHESIA W/CIRCUIT & 3/LT BAG W/FILTER (20EA/CA)

## (undated) DEVICE — SENSOR OXIMETER ADULT SPO2 RD SET (20EA/BX)

## (undated) DEVICE — SET EXTENSION WITH 2 PORTS (48EA/CA) ***PART #2C8610 IS A SUBSTITUTE*****

## (undated) DEVICE — TOWELS CLOTH SURGICAL - (4/PK 20PK/CA)

## (undated) DEVICE — DRESSING PETROLEUM GAUZE 5 X 9" (50EA/BX 4BX/CA)"

## (undated) DEVICE — SUCTION INSTRUMENT YANKAUER OPEN TIP W/O VENT (50EA/CA)

## (undated) DEVICE — SUCTION INSTRUMENT YANKAUER BULBOUS TIP W/O VENT (50EA/CA)

## (undated) DEVICE — STOCKINETTE, TUBULAR 6

## (undated) DEVICE — BIT DRILL SHORT 4.2MM X 155MM (4TX2=8)

## (undated) DEVICE — DRESSING TRANSPARENT FILM TEGADERM 4 X 4.75" (50EA/BX)"

## (undated) DEVICE — SUTURE ETHILON 2-0 FSLX 30 (36PK/BX)"

## (undated) DEVICE — ELECTRODE DUAL RETURN W/ CORD - (50/PK)

## (undated) DEVICE — BAG SPONGE COUNT 10.25 X 32 - BLUE (250/CA)

## (undated) DEVICE — PROTECTOR ULNA NERVE - (36PR/CA)

## (undated) DEVICE — SET LEADWIRE 5 LEAD BEDSIDE DISPOSABLE ECG (1SET OF 5/EA)

## (undated) DEVICE — CANISTER SUCTION 3000ML MECHANICAL FILTER AUTO SHUTOFF MEDI-VAC NONSTERILE LF DISP  (40EA/CA)

## (undated) DEVICE — DRAPE C ARMOR (12EA/CA)

## (undated) DEVICE — TUBE CONNECT SUCTION CLEAR 120 X 1/4" (50EA/CA)"

## (undated) DEVICE — DRAPE LARGE 3 QUARTER - (20/CA)

## (undated) DEVICE — SUTURE 0 VICRYL PLUS CT-1 - 36 INCH (36/BX)

## (undated) DEVICE — HEAD HOLDER JUNIOR/ADULT

## (undated) DEVICE — CHLORAPREP 26 ML APPLICATOR - ORANGE TINT(25/CA)

## (undated) DEVICE — TOWEL STOP TIMEOUT SAFETY FLAG (40EA/CA)

## (undated) DEVICE — NEPTUNE 4 PORT MANIFOLD - (20/PK)

## (undated) DEVICE — PENCIL ELECTSURG 10FT BTN SWH - (50/CA)

## (undated) DEVICE — GUIDE PIN CALIBRATED (5EA/PK) (4TX6=24)

## (undated) DEVICE — DRAPE 36X28IN RAD CARM BND BG - (25/CA) O

## (undated) DEVICE — ELECTRODE 850 FOAM ADHESIVE - HYDROGEL RADIOTRNSPRNT (50/PK)

## (undated) DEVICE — STAPLER SKIN DISP - (6/BX 10BX/CA) VISISTAT

## (undated) DEVICE — SLEEVE, VASO, THIGH, MED

## (undated) DEVICE — BIT DRILL LONG CALIBRATED 4.2MM X 330MM (4TX2=8)

## (undated) DEVICE — SUTURE 2-0 VICRYL PLUS CT-1 36 (36PK/BX)"